# Patient Record
Sex: MALE | Race: WHITE | NOT HISPANIC OR LATINO | Employment: OTHER | URBAN - METROPOLITAN AREA
[De-identification: names, ages, dates, MRNs, and addresses within clinical notes are randomized per-mention and may not be internally consistent; named-entity substitution may affect disease eponyms.]

---

## 2017-02-06 ENCOUNTER — HISTORICAL (OUTPATIENT)
Dept: LAB | Facility: HOSPITAL | Age: 72
End: 2017-02-06

## 2022-04-04 ENCOUNTER — HISTORICAL (OUTPATIENT)
Dept: ADMINISTRATIVE | Facility: HOSPITAL | Age: 77
End: 2022-04-04

## 2022-04-10 ENCOUNTER — HISTORICAL (OUTPATIENT)
Dept: ADMINISTRATIVE | Facility: HOSPITAL | Age: 77
End: 2022-04-10

## 2022-04-27 VITALS
DIASTOLIC BLOOD PRESSURE: 60 MMHG | WEIGHT: 163.38 LBS | SYSTOLIC BLOOD PRESSURE: 100 MMHG | HEIGHT: 72 IN | BODY MASS INDEX: 22.13 KG/M2

## 2023-06-19 ENCOUNTER — HOSPITAL ENCOUNTER (INPATIENT)
Facility: HOSPITAL | Age: 78
LOS: 2 days | Discharge: HOME | DRG: 948 | End: 2023-06-21
Attending: INTERNAL MEDICINE | Admitting: INTERNAL MEDICINE
Payer: MEDICARE

## 2023-06-19 DIAGNOSIS — Z79.899 POLYPHARMACY: ICD-10-CM

## 2023-06-19 DIAGNOSIS — R07.9 CHEST PAIN: ICD-10-CM

## 2023-06-19 DIAGNOSIS — F41.9 ANXIETY: ICD-10-CM

## 2023-06-19 DIAGNOSIS — G62.9 NEUROPATHY: ICD-10-CM

## 2023-06-19 DIAGNOSIS — R53.1 WEAKNESS: ICD-10-CM

## 2023-06-19 DIAGNOSIS — R42 DIZZINESS: Primary | ICD-10-CM

## 2023-06-19 DIAGNOSIS — R53.81 DEBILITY: ICD-10-CM

## 2023-06-19 PROBLEM — G47.31 CENTRAL SLEEP APNEA SYNDROME: Status: ACTIVE | Noted: 2018-08-24

## 2023-06-19 PROBLEM — G20.A1 PARKINSON'S DISEASE: Status: ACTIVE | Noted: 2023-06-19

## 2023-06-19 PROBLEM — I10 HYPERTENSION: Status: ACTIVE | Noted: 2023-06-19

## 2023-06-19 PROBLEM — G47.00 PERSISTENT INSOMNIA: Status: ACTIVE | Noted: 2018-08-24

## 2023-06-19 LAB
ALBUMIN SERPL-MCNC: 3.8 G/DL (ref 3.4–4.8)
ALBUMIN/GLOB SERPL: 1.4 RATIO (ref 1.1–2)
ALP SERPL-CCNC: 101 UNIT/L (ref 40–150)
ALT SERPL-CCNC: 13 UNIT/L (ref 0–55)
AMPHET UR QL SCN: NEGATIVE
APPEARANCE UR: CLEAR
AST SERPL-CCNC: 18 UNIT/L (ref 5–34)
BACTERIA #/AREA URNS AUTO: NORMAL /HPF
BARBITURATE SCN PRESENT UR: NEGATIVE
BASOPHILS # BLD AUTO: 0.03 X10(3)/MCL
BASOPHILS NFR BLD AUTO: 0.3 %
BENZODIAZ UR QL SCN: NEGATIVE
BILIRUB UR QL STRIP.AUTO: NEGATIVE MG/DL
BILIRUBIN DIRECT+TOT PNL SERPL-MCNC: 0.3 MG/DL
BNP BLD-MCNC: 45.3 PG/ML
BUN SERPL-MCNC: 14.1 MG/DL (ref 8.4–25.7)
CALCIUM SERPL-MCNC: 8.6 MG/DL (ref 8.8–10)
CANNABINOIDS UR QL SCN: NEGATIVE
CHLORIDE SERPL-SCNC: 106 MMOL/L (ref 98–107)
CO2 SERPL-SCNC: 27 MMOL/L (ref 23–31)
COCAINE UR QL SCN: NEGATIVE
COLOR UR: YELLOW
CREAT SERPL-MCNC: 1.2 MG/DL (ref 0.73–1.18)
EOSINOPHIL # BLD AUTO: 0.08 X10(3)/MCL (ref 0–0.9)
EOSINOPHIL NFR BLD AUTO: 0.7 %
ERYTHROCYTE [DISTWIDTH] IN BLOOD BY AUTOMATED COUNT: 14.1 % (ref 11.5–17)
FENTANYL UR QL SCN: NEGATIVE
GFR SERPLBLD CREATININE-BSD FMLA CKD-EPI: >60 MLS/MIN/1.73/M2
GLOBULIN SER-MCNC: 2.8 GM/DL (ref 2.4–3.5)
GLUCOSE SERPL-MCNC: 134 MG/DL (ref 82–115)
GLUCOSE UR QL STRIP.AUTO: NEGATIVE MG/DL
HCT VFR BLD AUTO: 50.4 % (ref 42–52)
HGB BLD-MCNC: 16.2 G/DL (ref 14–18)
IMM GRANULOCYTES # BLD AUTO: 0.04 X10(3)/MCL (ref 0–0.04)
IMM GRANULOCYTES NFR BLD AUTO: 0.4 %
KETONES UR QL STRIP.AUTO: NEGATIVE MG/DL
LEUKOCYTE ESTERASE UR QL STRIP.AUTO: NEGATIVE UNIT/L
LYMPHOCYTES # BLD AUTO: 1.82 X10(3)/MCL (ref 0.6–4.6)
LYMPHOCYTES NFR BLD AUTO: 16.9 %
MAGNESIUM SERPL-MCNC: 2.1 MG/DL (ref 1.6–2.6)
MCH RBC QN AUTO: 29.1 PG (ref 27–31)
MCHC RBC AUTO-ENTMCNC: 32.1 G/DL (ref 33–36)
MCV RBC AUTO: 90.6 FL (ref 80–94)
MDMA UR QL SCN: NEGATIVE
MONOCYTES # BLD AUTO: 0.83 X10(3)/MCL (ref 0.1–1.3)
MONOCYTES NFR BLD AUTO: 7.7 %
NEUTROPHILS # BLD AUTO: 7.97 X10(3)/MCL (ref 2.1–9.2)
NEUTROPHILS NFR BLD AUTO: 74 %
NITRITE UR QL STRIP.AUTO: NEGATIVE
NRBC BLD AUTO-RTO: 0 %
OPIATES UR QL SCN: NEGATIVE
PCP UR QL: NEGATIVE
PH UR STRIP.AUTO: 5 [PH]
PH UR: 5 [PH] (ref 3–11)
PLATELET # BLD AUTO: 162 X10(3)/MCL (ref 130–400)
PMV BLD AUTO: 10.4 FL (ref 7.4–10.4)
POTASSIUM SERPL-SCNC: 3.8 MMOL/L (ref 3.5–5.1)
PROT SERPL-MCNC: 6.6 GM/DL (ref 5.8–7.6)
PROT UR QL STRIP.AUTO: ABNORMAL MG/DL
RBC # BLD AUTO: 5.56 X10(6)/MCL (ref 4.7–6.1)
RBC #/AREA URNS AUTO: NORMAL /HPF
RBC UR QL AUTO: NEGATIVE UNIT/L
SODIUM SERPL-SCNC: 142 MMOL/L (ref 136–145)
SP GR UR STRIP.AUTO: 1.02 (ref 1–1.03)
SPECIFIC GRAVITY, URINE AUTO (.000) (OHS): 1.02 (ref 1–1.03)
SQUAMOUS #/AREA URNS AUTO: NORMAL /HPF
TROPONIN I SERPL-MCNC: <0.01 NG/ML (ref 0–0.04)
TSH SERPL-ACNC: 1.64 UIU/ML (ref 0.35–4.94)
UROBILINOGEN UR STRIP-ACNC: 1 MG/DL
WBC # SPEC AUTO: 10.77 X10(3)/MCL (ref 4.5–11.5)
WBC #/AREA URNS AUTO: NORMAL /HPF

## 2023-06-19 PROCEDURE — 93010 EKG 12-LEAD: ICD-10-PCS | Mod: ,,, | Performed by: STUDENT IN AN ORGANIZED HEALTH CARE EDUCATION/TRAINING PROGRAM

## 2023-06-19 PROCEDURE — 25000003 PHARM REV CODE 250

## 2023-06-19 PROCEDURE — 80053 COMPREHEN METABOLIC PANEL: CPT

## 2023-06-19 PROCEDURE — 96374 THER/PROPH/DIAG INJ IV PUSH: CPT

## 2023-06-19 PROCEDURE — 99285 EMERGENCY DEPT VISIT HI MDM: CPT | Mod: 25

## 2023-06-19 PROCEDURE — 84484 ASSAY OF TROPONIN QUANT: CPT

## 2023-06-19 PROCEDURE — 93005 ELECTROCARDIOGRAM TRACING: CPT

## 2023-06-19 PROCEDURE — 85025 COMPLETE CBC W/AUTO DIFF WBC: CPT

## 2023-06-19 PROCEDURE — 83735 ASSAY OF MAGNESIUM: CPT

## 2023-06-19 PROCEDURE — 81001 URINALYSIS AUTO W/SCOPE: CPT

## 2023-06-19 PROCEDURE — 63600175 PHARM REV CODE 636 W HCPCS

## 2023-06-19 PROCEDURE — 84443 ASSAY THYROID STIM HORMONE: CPT

## 2023-06-19 PROCEDURE — 83880 ASSAY OF NATRIURETIC PEPTIDE: CPT

## 2023-06-19 PROCEDURE — 96361 HYDRATE IV INFUSION ADD-ON: CPT

## 2023-06-19 PROCEDURE — 11000001 HC ACUTE MED/SURG PRIVATE ROOM

## 2023-06-19 PROCEDURE — 93010 ELECTROCARDIOGRAM REPORT: CPT | Mod: ,,, | Performed by: STUDENT IN AN ORGANIZED HEALTH CARE EDUCATION/TRAINING PROGRAM

## 2023-06-19 PROCEDURE — 80307 DRUG TEST PRSMV CHEM ANLYZR: CPT

## 2023-06-19 RX ORDER — ACETAMINOPHEN 325 MG/1
650 TABLET ORAL EVERY 8 HOURS PRN
Status: DISCONTINUED | OUTPATIENT
Start: 2023-06-19 | End: 2023-06-21 | Stop reason: HOSPADM

## 2023-06-19 RX ORDER — ALPHA LIPOIC ACID 50 MG
250 TABLET ORAL NIGHTLY
COMMUNITY

## 2023-06-19 RX ORDER — IBUPROFEN 200 MG
24 TABLET ORAL
Status: DISCONTINUED | OUTPATIENT
Start: 2023-06-19 | End: 2023-06-21 | Stop reason: HOSPADM

## 2023-06-19 RX ORDER — GLUCAGON 1 MG
1 KIT INJECTION
Status: DISCONTINUED | OUTPATIENT
Start: 2023-06-19 | End: 2023-06-21 | Stop reason: HOSPADM

## 2023-06-19 RX ORDER — LINACLOTIDE 145 UG/1
145 CAPSULE, GELATIN COATED ORAL DAILY
COMMUNITY
Start: 2023-06-13

## 2023-06-19 RX ORDER — BUPRENORPHINE AND NALOXONE 8; 2 MG/1; MG/1
2 FILM, SOLUBLE BUCCAL; SUBLINGUAL DAILY
Status: DISCONTINUED | OUTPATIENT
Start: 2023-06-20 | End: 2023-06-19

## 2023-06-19 RX ORDER — MEMANTINE HYDROCHLORIDE 10 MG/1
10 TABLET ORAL 2 TIMES DAILY
COMMUNITY
Start: 2023-01-20 | End: 2024-01-20

## 2023-06-19 RX ORDER — BUPRENORPHINE AND NALOXONE 8; 2 MG/1; MG/1
2 FILM, SOLUBLE BUCCAL; SUBLINGUAL DAILY
COMMUNITY

## 2023-06-19 RX ORDER — BUPRENORPHINE HYDROCHLORIDE 8 MG/1
8 TABLET SUBLINGUAL DAILY
Status: DISCONTINUED | OUTPATIENT
Start: 2023-06-20 | End: 2023-06-20

## 2023-06-19 RX ORDER — PANTOPRAZOLE SODIUM 40 MG/1
40 TABLET, DELAYED RELEASE ORAL 2 TIMES DAILY
COMMUNITY
Start: 2023-02-23

## 2023-06-19 RX ORDER — DOCUSATE SODIUM 100 MG/1
100 CAPSULE, LIQUID FILLED ORAL 2 TIMES DAILY
COMMUNITY

## 2023-06-19 RX ORDER — ACETAMINOPHEN 325 MG/1
650 TABLET ORAL EVERY 4 HOURS PRN
Status: DISCONTINUED | OUTPATIENT
Start: 2023-06-19 | End: 2023-06-21 | Stop reason: HOSPADM

## 2023-06-19 RX ORDER — ONDANSETRON 2 MG/ML
4 INJECTION INTRAMUSCULAR; INTRAVENOUS EVERY 8 HOURS PRN
Status: DISCONTINUED | OUTPATIENT
Start: 2023-06-19 | End: 2023-06-21 | Stop reason: HOSPADM

## 2023-06-19 RX ORDER — DICYCLOMINE HYDROCHLORIDE 10 MG/1
10 CAPSULE ORAL EVERY 8 HOURS PRN
COMMUNITY
Start: 2023-03-28

## 2023-06-19 RX ORDER — ACETAMINOPHEN 500 MG
1000 TABLET ORAL EVERY 8 HOURS PRN
Status: DISCONTINUED | OUTPATIENT
Start: 2023-06-19 | End: 2023-06-21 | Stop reason: HOSPADM

## 2023-06-19 RX ORDER — CLONAZEPAM 1 MG/1
1 TABLET ORAL NIGHTLY PRN
COMMUNITY
Start: 2023-05-30

## 2023-06-19 RX ORDER — IBUPROFEN 200 MG
15 TABLET ORAL
Status: DISCONTINUED | OUTPATIENT
Start: 2023-06-19 | End: 2023-06-21 | Stop reason: HOSPADM

## 2023-06-19 RX ORDER — TESTOSTERONE CYPIONATE 200 MG/ML
200 INJECTION, SOLUTION INTRAMUSCULAR
COMMUNITY
Start: 2023-03-23

## 2023-06-19 RX ORDER — SODIUM CHLORIDE 0.9 % (FLUSH) 0.9 %
10 SYRINGE (ML) INJECTION EVERY 12 HOURS PRN
Status: DISCONTINUED | OUTPATIENT
Start: 2023-06-19 | End: 2023-06-21 | Stop reason: HOSPADM

## 2023-06-19 RX ORDER — ASPIRIN 81 MG/1
1 TABLET ORAL DAILY
COMMUNITY

## 2023-06-19 RX ORDER — ENOXAPARIN SODIUM 100 MG/ML
40 INJECTION SUBCUTANEOUS EVERY 24 HOURS
Status: DISCONTINUED | OUTPATIENT
Start: 2023-06-19 | End: 2023-06-21 | Stop reason: HOSPADM

## 2023-06-19 RX ORDER — ACARBOSE 25 MG/1
25 TABLET ORAL 3 TIMES DAILY
COMMUNITY

## 2023-06-19 RX ORDER — AMLODIPINE BESYLATE 5 MG/1
5 TABLET ORAL DAILY
COMMUNITY
Start: 2023-05-14

## 2023-06-19 RX ORDER — NALOXONE HCL 0.4 MG/ML
0.02 VIAL (ML) INJECTION
Status: DISCONTINUED | OUTPATIENT
Start: 2023-06-19 | End: 2023-06-21 | Stop reason: HOSPADM

## 2023-06-19 RX ORDER — NALOXEGOL OXALATE 25 MG/1
25 TABLET, FILM COATED ORAL DAILY
COMMUNITY
Start: 2023-05-17

## 2023-06-19 RX ORDER — DROPERIDOL 2.5 MG/ML
1.25 INJECTION, SOLUTION INTRAMUSCULAR; INTRAVENOUS
Status: COMPLETED | OUTPATIENT
Start: 2023-06-19 | End: 2023-06-19

## 2023-06-19 RX ADMIN — SODIUM CHLORIDE 1000 ML: 9 INJECTION, SOLUTION INTRAVENOUS at 12:06

## 2023-06-19 RX ADMIN — ENOXAPARIN SODIUM 40 MG: 40 INJECTION SUBCUTANEOUS at 08:06

## 2023-06-19 RX ADMIN — DROPERIDOL 1.25 MG: 2.5 INJECTION, SOLUTION INTRAMUSCULAR; INTRAVENOUS at 04:06

## 2023-06-19 NOTE — ED PROVIDER NOTES
"Encounter Date: 6/19/2023       History     Chief Complaint   Patient presents with    Dizziness     Pt c/o dizziness and blurry vision for 2 weeks after starting a new medication.      The patient is a 77 y.o. male with a history of hypertension, hyperlipidemia, chronic neuropathy who presents to the Emergency Department with a chief complaint of dizziness.  Patient reports that he was started on Lyrica by his neurologist on May 23rd.  He states that he was initially taking 25 mg once a day which was eventually increased to 3 times daily.  Patient states he then started to experience "debility".  He states that he has been having difficulty ambulating, generalized weakness, imbalance.  Wife states that they eventually cut Lyrica back to once daily before completely stopping the medication.  She states that the symptoms are still present even after stopping the medication.  Wife also states she thinks that patient needs to see a psychiatrist for mood disorder.  She states that she has been having a difficult time getting patient in with a psychiatrist because he is on Suboxone, Klonopin, as well as Lyrica.  Wife states that he is no longer even able to feed himself.  Symptoms began   Three weeks ago and have been worsening since onset. His pain is currently rated as a 5/10 in severity and described as aching with no radiation.  The patient denies chest pain, shortness of breath, fever, or chills. He reports taking nothing prior to arrival with no relief of symptoms. No other reported symptoms at this time.    The history is provided by the patient. No  was used.   Dizziness  This is a new problem. The current episode started more than 1 week ago. The problem occurs daily. The problem has not changed since onset.Pertinent negatives include no chest pain, no abdominal pain, no headaches and no shortness of breath. Nothing aggravates the symptoms. Nothing relieves the symptoms. He has tried nothing " for the symptoms. The treatment provided no relief.   Review of patient's allergies indicates:  No Known Allergies  Past Medical History:   Diagnosis Date    Hypertension      History reviewed. No pertinent surgical history.  History reviewed. No pertinent family history.  Social History     Tobacco Use    Smoking status: Never    Smokeless tobacco: Never     Review of Systems   Constitutional:  Negative for fever.   HENT:  Negative for congestion, rhinorrhea and sore throat.    Respiratory:  Negative for shortness of breath.    Cardiovascular:  Negative for chest pain.   Gastrointestinal:  Negative for abdominal pain and nausea.   Genitourinary:  Negative for dysuria.   Musculoskeletal:  Negative for back pain.   Skin:  Negative for rash.   Neurological:  Positive for dizziness, tremors and weakness. Negative for headaches.   Hematological:  Does not bruise/bleed easily.   All other systems reviewed and are negative.    Physical Exam     Initial Vitals [06/19/23 1129]   BP Pulse Resp Temp SpO2   (!) 164/90 88 18 97.5 °F (36.4 °C) 99 %      MAP       --         Physical Exam    Nursing note and vitals reviewed.  Constitutional: Vital signs are normal. He appears well-developed and well-nourished.   HENT:   Head: Normocephalic.   Right Ear: Hearing and tympanic membrane normal.   Left Ear: Hearing and tympanic membrane normal.   Mouth/Throat: Uvula is midline, oropharynx is clear and moist and mucous membranes are normal.   Eyes: Conjunctivae and EOM are normal. Pupils are equal, round, and reactive to light.   Cardiovascular:  Normal rate, regular rhythm, normal heart sounds and normal pulses.           Pulmonary/Chest: Effort normal and breath sounds normal.   Abdominal: Abdomen is soft. Bowel sounds are normal. There is no abdominal tenderness.   Musculoskeletal:      Cervical back: No spinous process tenderness or muscular tenderness.     Lymphadenopathy:     He has no cervical adenopathy.   Neurological: He is  alert. He displays tremor. GCS eye subscore is 4. GCS verbal subscore is 5. GCS motor subscore is 6.   Bilateral hand tremors   Skin: Skin is warm, dry and intact. Capillary refill takes less than 2 seconds.   Psychiatric: His affect is angry and blunt. He is agitated.       ED Course   Procedures  Labs Reviewed   COMPREHENSIVE METABOLIC PANEL - Abnormal; Notable for the following components:       Result Value    Glucose Level 134 (*)     Creatinine 1.20 (*)     Calcium Level Total 8.6 (*)     All other components within normal limits   URINALYSIS, REFLEX TO URINE CULTURE - Abnormal; Notable for the following components:    Protein, UA Trace (*)     All other components within normal limits   CBC WITH DIFFERENTIAL - Abnormal; Notable for the following components:    MCHC 32.1 (*)     All other components within normal limits   TROPONIN I - Normal   TSH - Normal   B-TYPE NATRIURETIC PEPTIDE - Normal   DRUG SCREEN, URINE (BEAKER) - Normal    Narrative:     Cut off concentrations:    Amphetamines - 1000 ng/ml  Barbiturates - 200 ng/ml  Benzodiazepine - 200 ng/ml  Cannabinoids (THC) - 50 ng/ml  Cocaine - 300 ng/ml  Fentanyl - 1.0 ng/ml  MDMA - 500 ng/ml  Opiates - 300 ng/ml   Phencyclidine (PCP) - 25 ng/ml    Specimen submitted for drug analysis and tested for pH and specific gravity in order to evaluate sample integrity. Suspect tampering if specific gravity is <1.003 and/or pH is not within the range of 4.5 - 8.0  False negatives may result form substances such as bleach added to urine.  False positives may result for the presence of a substance with similar chemical structure to the drug or its metabolite.    This test provides only a PRELIMINARY analytical test result. A more specific alternate chemical method must be used in order to obtain a confirmed analytical result. Gas chromatography/mass spectrometry (GC/MS) is the preferred confirmatory method. Other chemical confirmation methods are available. Clinical  consideration and professional judgement should be applied to any drug of abuse test result, particularly when preliminary positive results are used.    Positive results will be confirmed only at the physicians request. Unconfirmed screening results are to be used only for medical purposes (treatment).        MAGNESIUM - Normal   URINALYSIS, MICROSCOPIC - Normal   CBC W/ AUTO DIFFERENTIAL    Narrative:     The following orders were created for panel order CBC Auto Differential.  Procedure                               Abnormality         Status                     ---------                               -----------         ------                     CBC with Differential[[id number]]        Abnormal            Final result                 Please view results for these tests on the individual orders.          Imaging Results              CT Head Without Contrast (Final result)  Result time 06/19/23 14:29:19      Final result by Isai Mayfield MD (06/19/23 14:29:19)                   Impression:      No acute intracranial findings.      Electronically signed by: Isai Mayfield  Date:    06/19/2023  Time:    14:29               Narrative:    EXAMINATION:  CT HEAD WITHOUT CONTRAST    CLINICAL HISTORY:  Dizziness, persistent/recurrent, cardiac or vascular cause suspected;    TECHNIQUE:  CT imaging of the head performed from the skull base to the vertex without intravenous contrast. DLP 1011 mGycm. Automatic exposure control, adjustment of mA/kV or iterative reconstruction technique was used to reduce radiation.    COMPARISON:  27 July 2021    FINDINGS:  There is no acute cortical infarct, hemorrhage or mass lesion.  There is mild patchy hypoattenuation in the cerebral white matter which is nonspecific but most commonly associated with chronic small vessel ischemic changes.  The ventricles are not significantly enlarged.  There are vascular calcifications.    Visualized paranasal sinuses and mastoid air cells are clear.       "                                 X-Ray Chest 1 View (Final result)  Result time 06/19/23 12:30:36      Final result by Neo Werner MD (06/19/23 12:30:36)                   Impression:      No acute cardiopulmonary process.      Electronically signed by: Neo Werner  Date:    06/19/2023  Time:    12:30               Narrative:    EXAMINATION:  XR CHEST 1 VIEW    CLINICAL HISTORY:  weakness;    TECHNIQUE:  Single view of the chest    COMPARISON:  08/26/2014    FINDINGS:  No focal opacification, pleural effusion, or pneumothorax.    The cardiomediastinal silhouette is within normal limits.    No acute osseous abnormality.                                       Medications   droPERidol injection 1.25 mg (has no administration in time range)   sodium chloride 0.9% bolus 1,000 mL 1,000 mL (0 mLs Intravenous Stopped 6/19/23 1336)     Medical Decision Making:   Initial Assessment:   The patient is a 77 y.o. male with a history of hypertension, hyperlipidemia, chronic neuropathy who presents to the Emergency Department with a chief complaint of dizziness.  Patient reports that he was started on Lyrica by his neurologist on May 23rd.  He states that he was initially taking 25 mg once a day which was eventually increased to 3 times daily.  Patient states he then started to experience "debility".  He states that he has been having difficulty ambulating, generalized weakness, imbalance.  Wife states that they eventually cut Lyrica back to once daily before completely stopping the medication.  She states that the symptoms are still present even after stopping the medication.  Wife also states she thinks that patient needs to see a psychiatrist for mood disorder.  She states that she has been having a difficult time getting patient in with a psychiatrist because he is on Suboxone, Klonopin, as well as Lyrica.  Wife states that he is no longer even able to feed himself.  Symptoms began   Three weeks ago and have been " worsening since onset. His pain is currently rated as a 5/10 in severity and described as aching with no radiation.  The patient denies chest pain, shortness of breath, fever, or chills. He reports taking nothing prior to arrival with no relief of symptoms. No other reported symptoms at this time.  Differential Diagnosis:   Differential diagnosis include but are not limited to general debility, general deconditioning, dementia, urinary tract infection, CVA, polypharmacy, psychiatric mood disorder  Clinical Tests:   Lab Tests: Ordered and Reviewed  Radiological Study: Reviewed and Ordered  Medical Tests: Ordered and Reviewed  ED Management:  MDM  History obtained by patient.  Co-morbidities and/or factors adding to the complexity or risk for the patient?: none  Differential diagnoses: Differential diagnosis include but are not limited to general debility, general deconditioning, dementia, urinary tract infection, CVA, polypharmacy, psychiatric mood disorder  Decision to obtain previous or outside records?: no  Chart Review (nursing home, outside records, CareEverywhere): none  Review of RX medications/new RX prescribed by me?: none  My EKG Interpretation: see above  Labs/imaging/other tests obtained/considered (risk/benefits of testing discussed):  CBC, CMP, TSH, troponin, BNP, UA, UDS  Labs/tests intepretation: Creatinine 1.20; Otherwise labs unremarkable: CT head with no acute findings  My independent imaging interpretation: none  Treatment/interventions, IV fluids, IV medications: inapsine  Complex management (IV controlled substances, went to OR, DNR, meds requiring monitoring, transfer, etc)?: none  Workup/treatment affected by social determinants of health?: none   Point of care US done/interpretation: none  Consults/radiologist/EMS/social work/family discussion/alternate history: none  Advanced care planning/end of life discussion: none  Shared decision making: Shared decision making with patient and  wife.  ETOH/smoking/drug cessation discussion: none  Dispo: Admit. Will admit to hospital medicine for neurology and psychiatric consult. Patient would also likely benefit from PT/OT. Wife states patient is severely debilitated.    Other:   I have discussed this case with another health care provider.       <> Summary of the Discussion: Discussed with ED attending, , he had face to face encounter with patient.     Discussed with KAYY Rider with hospital medicine. Accepts admission for Dr. Hoskins.            ED Course as of 06/19/23 1630   Mon Jun 19, 2023   1554 Discussed with  who had face to face encounter with patient. Recommends 1.25mg of inapsine. Will discuss with hospital medicine for admission.  [LM]      ED Course User Index  [LM] Juliette Sevilla NP                 Clinical Impression:   Final diagnoses:  [R53.1] Weakness  [R42] Dizziness (Primary)  [R53.81] Debility  [F41.9] Anxiety  [G62.9] Neuropathy  [Z79.899] Polypharmacy        ED Disposition Condition    Admit Stable                Juliette Sevilla NP  06/19/23 1631

## 2023-06-19 NOTE — H&P
[facility] Hospital Medicine History & Physical Examination       Patient Name: Dax Boone  MRN: [id number]  Patient Class: IP- Inpatient   Admission Date: 6/19/2023   Admitting Physician: MIRIAN Service   Length of Stay: 0  Attending Physician:    Primary Care Provider: Darian Crawford  Face-to-Face encounter date: 06/19/2023  Code Status:Full   Chief Complaint: Dizziness (Pt c/o dizziness and blurry vision for 2 weeks after starting a new medication. )    Patient information was obtained from patient, patient's family, past medical records and ER records.  ED records were reviewed in detail and documented below    HISTORY OF PRESENT ILLNESS:   Dax Boone is a 77 y.o. male who has a past medical history of HTN, HLD, CAD/PCI, CVA, functional movement disorder, dementia, and chronic neuropathy followed by Dr. Estrada who presented to [facility] on 6/19/2023 with a primary complaint of dizziness and bilateral lower extremity weakness since May 23rd when he started Lyrica.  He is accompanied by his wife who assists with history and explains that he took Lyrica 25 mg daily for 1 week that was increased to b.i.d. the 2nd week, and before it could be increased to t.i.d. she completely stopped his Lyrica on June 10th.  She states that his equilibrium/balance worsened until he was he unable to ambulate which started on June 10th. She states prior to starting Lyrica he was ambulating without any difficulties.  The patient denies any falls, syncope, trauma, or MVAs.  The patient and wife were requesting psychiatric consultation for anxiety and dementia.  He is on Klonopin, Suboxone, and previously on Lyrica.  Patient states that his symptoms have been constant, nothing makes better or worse, no radiating pains, and rates generalized weakness and intermittent dizziness 6/10 in severity.  He does admit to some anxiety.  He denies any chest pain, shortness of breath, cough,  palpitations, abdominal pain, nausea, vomiting, diarrhea, dysuria, bleeding symptoms, headaches, visual disturbances, depression, suicidal ideations, homicidal ideations, hallucinations, syncope, unilateral weakness, falls, any injuries.      ED course: Vital signs revealed the patient is afebrile stable on room air.  Labs with unremarkable CBC, CMP with mildly elevated creatinine of 1.20 normal BUN 14, mildly elevated glucose of 134, unremarkable TSH, BNP, troponin, UDS, and UA.  CT head without contrast showed no acute intracranial abnormalities.  CXR showed no acute cardiopulmonary findings.  Patient treated in the ED with 1 L normal saline and droperidol.      Patient admitted for neuropathy , generalized weakness, and polypharmacy        PAST MEDICAL HISTORY:     Chronic neuropathy  Polypharmacy   Functional movement disorder   Dementia   Opiate dependence disorder   Generalized anxiety disorder   Mild cognitive impairment  History of CVA  Cervical degenerative disc disease   Coronary artery disease with history of PCI (2018)   Essential hypertension  Hypercholesterolemia     PAST SURGICAL HISTORY:   Lumbar spinal fusion in 1970s   PCI in 2018    ALLERGIES:   Patient has no known allergies.    FAMILY HISTORY:   Reviewed and negative per patient and wife    SOCIAL HISTORY:     Social History     Tobacco Use    Smoking status: Never    Smokeless tobacco: Never   Substance Use Topics    Alcohol use: Not on file    Alcohol: Denies use   Drugs: Denies use    HOME MEDICATIONS:     Prior to Admission medications    Not on File   MD to nurse to reconcile home medications    REVIEW OF SYSTEMS:   Except as documented, all other systems reviewed and negative     PHYSICAL EXAM:     VITAL SIGNS: 24 HRS MIN & MAX LAST   Temp  Min: 97.5 °F (36.4 °C)  Max: 97.5 °F (36.4 °C) 97.5 °F (36.4 °C)   BP  Min: 149/91  Max: 164/90 (!) 162/97   Pulse  Min: 73  Max: 97  97   Resp  Min: 18  Max: 22 (!) 22   SpO2  Min: 96 %  Max: 99 % 96 %        General appearance: Well-developed, well-nourished elderly  male in no apparent distress with wife at bedside  HENT: Atraumatic head. Moist mucous membranes of oral cavity.  Eyes: Normal extraocular movements.   Neck: Supple.   Lungs: Clear to auscultation bilaterally. No wheezing present.   Heart: Regular rate and rhythm. S1 and S2 present with no murmurs/gallop/rub. No pedal edema. No JVD present.   Abdomen: Soft, non-distended, non-tender. No rebound tenderness/guarding. Bowel sounds are normal.   Extremities: No cyanosis, clubbing, or edema.  Skin: No Rash.   Neuro: Motor and sensory exams grossly intact. Good tone. Muscle strength 4/5 in all 4 extremities.  Patient has shuffling gait on exam.  Does not appear ataxic.  Psych/mental status: Appropriate mood and affect. Responds appropriately to questions.     LABS AND IMAGING:     Recent Labs   Lab 06/19/23  1209   WBC 10.77   RBC 5.56   HGB 16.2   HCT 50.4   MCV 90.6   MCH 29.1   MCHC 32.1*   RDW 14.1      MPV 10.4       Recent Labs   Lab 06/19/23  1209      K 3.8   CO2 27   BUN 14.1   CREATININE 1.20*   CALCIUM 8.6*   MG 2.10   ALBUMIN 3.8   ALKPHOS 101   ALT 13   AST 18   BILITOT 0.3       Microbiology Results (last 7 days)       ** No results found for the last 168 hours. **             CT Head Without Contrast  Narrative: EXAMINATION:  CT HEAD WITHOUT CONTRAST    CLINICAL HISTORY:  Dizziness, persistent/recurrent, cardiac or vascular cause suspected;    TECHNIQUE:  CT imaging of the head performed from the skull base to the vertex without intravenous contrast. DLP 1011 mGycm. Automatic exposure control, adjustment of mA/kV or iterative reconstruction technique was used to reduce radiation.    COMPARISON:  27 July 2021    FINDINGS:  There is no acute cortical infarct, hemorrhage or mass lesion.  There is mild patchy hypoattenuation in the cerebral white matter which is nonspecific but most commonly associated with chronic small  vessel ischemic changes.  The ventricles are not significantly enlarged.  There are vascular calcifications.    Visualized paranasal sinuses and mastoid air cells are clear.  Impression: No acute intracranial findings.    Electronically signed by: Isai Mayfield  Date:    06/19/2023  Time:    14:29  X-Ray Chest 1 View  Narrative: EXAMINATION:  XR CHEST 1 VIEW    CLINICAL HISTORY:  weakness;    TECHNIQUE:  Single view of the chest    COMPARISON:  08/26/2014    FINDINGS:  No focal opacification, pleural effusion, or pneumothorax.    The cardiomediastinal silhouette is within normal limits.    No acute osseous abnormality.  Impression: No acute cardiopulmonary process.    Electronically signed by: Neo Werner  Date:    06/19/2023  Time:    12:30        ASSESSMENT & PLAN:     Neuropathy   Generalized weakness  Functional movement disorder   Polypharmacy    Dementia   Opiate dependence disorder   Generalized anxiety disorder   Mild cognitive impairment  History of CVA  Cervical degenerative disc disease   Coronary artery disease with history of PCI (2018)   Essential hypertension  Hypercholesterolemia     -Consider neurological consultation   -Consider MRI of lumbar spine   -Consult to psychiatrist and appreciate their recommendations  -MD to nurse to reconcile home medications   -Hold Lyrica and resume appropriate home medicines once reconciled  -Consulted PT and OT   -A.m. labs-CBC, CMP, magnesium, and phosphorus      VTE Prophylaxis: will be placed on Lovenox for DVT prophylaxis and will be advised to be as mobile as possible and sit in a chair as tolerated    Patient condition:  Stable  __________________________________________________________________________  INPATIENT LIST OF MEDICATIONS     Scheduled Meds:   enoxparin  40 mg Subcutaneous Daily     Continuous Infusions:  PRN Meds:.acetaminophen, acetaminophen, acetaminophen, dextrose 10%, dextrose 10%, dextrose, dextrose, glucagon (human recombinant), naloxone,  ondansetron, sodium chloride 0.9%, trazodone      I, Jaylen SUN have reviewed and discussed the case with    Please see the following addendum for further assessment and plan from there attending MD.    06/19/2023    Gato-chart reviewed patient examined.  Patient is 77-year-old male with history of hypertension/hyperlipidemia/CAD with PCI, CVA, functional movement disorder, dementia and chronic neuropathy followed by Dr. Estrada as well as Dr. Hilton Smith.  Patient presents complaining of dizziness/blurry vision after he stops his Lyrica around 2 weeks ago.  Patient refers his neurologist place him on Lyrica low-dose 25 mg p.o. daily and slowly increase to 50 with development of ataxia/blurry vision/dizziness.  Patient probably has some depression as well as underlying dementia.  Explained to patient's wife that Mr. Lazo has a primary care physician that actually comes to [facility] today care of his patients.  Will allow the patient to resume Suboxone and will consult psych as requested per patient's wife.  We will notify Dr. Smith that his patient is at the emergency room, going to be admitted tonight so he can come see him tomorrow to make disposition.    ________________________________________________________________________________

## 2023-06-19 NOTE — FIRST PROVIDER EVALUATION
"Medical screening examination initiated.  I have conducted a focused provider triage encounter, findings are as follows:    Brief history of present illness:  77-year-old male presents to ER with complaint of dizziness for the last 2 weeks.  Patient states about 3 weeks ago he was started on Lyrica.  He states that he is currently taking Lyrica 3 times a day.  Patient states that he is having difficulty taking care of himself because of how the medication makes him feel.  Patient states that he feels he is on too many narcotic medications.    Vitals:    06/19/23 1129   BP: (!) 164/90   Pulse: 88   Resp: 18   Temp: 97.5 °F (36.4 °C)   TempSrc: Temporal   SpO2: 99%   Weight: 77.1 kg (170 lb)   Height: 5' 11" (1.803 m)       Pertinent physical exam:  Awake and alert, no acute distress    Brief workup plan:  Labs, imaging, UA, UDS    Preliminary workup initiated; this workup will be continued and followed by the physician or advanced practice provider that is assigned to the patient when roomed.  "

## 2023-06-19 NOTE — Clinical Note
Diagnosis: Dizziness [128001]   Admitting Provider:: CHENCHO MATHIS [[id number]]   Future Attending Provider: CHENCHO MATHIS [[id number]]   Reason for IP Medical Treatment  (Clinical interventions that can only be accomplished in the IP setting? ) :: Neurology, psychiatric consult; PT/PT   I certify that Inpatient services for greater than or equal to 2 midnights are medically necessary:: Yes   Plans for Post-Acute care--if anticipated (pick the single best option):: A. No post acute care anticipated at this time

## 2023-06-20 PROBLEM — R29.898 WEAKNESS OF BOTH LOWER EXTREMITIES: Status: ACTIVE | Noted: 2023-06-20

## 2023-06-20 LAB
ALBUMIN SERPL-MCNC: 3.5 G/DL (ref 3.4–4.8)
ALBUMIN/GLOB SERPL: 1.6 RATIO (ref 1.1–2)
ALP SERPL-CCNC: 91 UNIT/L (ref 40–150)
ALT SERPL-CCNC: 10 UNIT/L (ref 0–55)
AST SERPL-CCNC: 16 UNIT/L (ref 5–34)
BASOPHILS # BLD AUTO: 0.03 X10(3)/MCL
BASOPHILS NFR BLD AUTO: 0.3 %
BILIRUBIN DIRECT+TOT PNL SERPL-MCNC: 0.7 MG/DL
BUN SERPL-MCNC: 11.2 MG/DL (ref 8.4–25.7)
CALCIUM SERPL-MCNC: 8.5 MG/DL (ref 8.8–10)
CHLORIDE SERPL-SCNC: 108 MMOL/L (ref 98–107)
CO2 SERPL-SCNC: 27 MMOL/L (ref 23–31)
CREAT SERPL-MCNC: 1.14 MG/DL (ref 0.73–1.18)
EOSINOPHIL # BLD AUTO: 0.08 X10(3)/MCL (ref 0–0.9)
EOSINOPHIL NFR BLD AUTO: 0.8 %
ERYTHROCYTE [DISTWIDTH] IN BLOOD BY AUTOMATED COUNT: 13.9 % (ref 11.5–17)
GFR SERPLBLD CREATININE-BSD FMLA CKD-EPI: >60 MLS/MIN/1.73/M2
GLOBULIN SER-MCNC: 2.2 GM/DL (ref 2.4–3.5)
GLUCOSE SERPL-MCNC: 99 MG/DL (ref 82–115)
HCT VFR BLD AUTO: 46.6 % (ref 42–52)
HGB BLD-MCNC: 15.1 G/DL (ref 14–18)
IMM GRANULOCYTES # BLD AUTO: 0.03 X10(3)/MCL (ref 0–0.04)
IMM GRANULOCYTES NFR BLD AUTO: 0.3 %
LYMPHOCYTES # BLD AUTO: 2.67 X10(3)/MCL (ref 0.6–4.6)
LYMPHOCYTES NFR BLD AUTO: 26.8 %
MAGNESIUM SERPL-MCNC: 2 MG/DL (ref 1.6–2.6)
MCH RBC QN AUTO: 29 PG (ref 27–31)
MCHC RBC AUTO-ENTMCNC: 32.4 G/DL (ref 33–36)
MCV RBC AUTO: 89.6 FL (ref 80–94)
MONOCYTES # BLD AUTO: 0.77 X10(3)/MCL (ref 0.1–1.3)
MONOCYTES NFR BLD AUTO: 7.7 %
NEUTROPHILS # BLD AUTO: 6.4 X10(3)/MCL (ref 2.1–9.2)
NEUTROPHILS NFR BLD AUTO: 64.1 %
NRBC BLD AUTO-RTO: 0 %
PHOSPHATE SERPL-MCNC: 3 MG/DL (ref 2.3–4.7)
PLATELET # BLD AUTO: 160 X10(3)/MCL (ref 130–400)
PMV BLD AUTO: 10.7 FL (ref 7.4–10.4)
POTASSIUM SERPL-SCNC: 4.3 MMOL/L (ref 3.5–5.1)
PROT SERPL-MCNC: 5.7 GM/DL (ref 5.8–7.6)
RBC # BLD AUTO: 5.2 X10(6)/MCL (ref 4.7–6.1)
SODIUM SERPL-SCNC: 141 MMOL/L (ref 136–145)
WBC # SPEC AUTO: 9.98 X10(3)/MCL (ref 4.5–11.5)

## 2023-06-20 PROCEDURE — 25000003 PHARM REV CODE 250: Performed by: INTERNAL MEDICINE

## 2023-06-20 PROCEDURE — 80053 COMPREHEN METABOLIC PANEL: CPT

## 2023-06-20 PROCEDURE — 25000003 PHARM REV CODE 250: Performed by: NURSE PRACTITIONER

## 2023-06-20 PROCEDURE — 97165 OT EVAL LOW COMPLEX 30 MIN: CPT

## 2023-06-20 PROCEDURE — 84100 ASSAY OF PHOSPHORUS: CPT

## 2023-06-20 PROCEDURE — 63600175 PHARM REV CODE 636 W HCPCS

## 2023-06-20 PROCEDURE — 97162 PT EVAL MOD COMPLEX 30 MIN: CPT

## 2023-06-20 PROCEDURE — 25000003 PHARM REV CODE 250

## 2023-06-20 PROCEDURE — 21400001 HC TELEMETRY ROOM

## 2023-06-20 PROCEDURE — 83735 ASSAY OF MAGNESIUM: CPT

## 2023-06-20 PROCEDURE — 85025 COMPLETE CBC W/AUTO DIFF WBC: CPT

## 2023-06-20 RX ORDER — TRAZODONE HYDROCHLORIDE 50 MG/1
50 TABLET ORAL NIGHTLY
Status: DISCONTINUED | OUTPATIENT
Start: 2023-06-20 | End: 2023-06-21 | Stop reason: HOSPADM

## 2023-06-20 RX ORDER — DIVALPROEX SODIUM 250 MG/1
250 TABLET, FILM COATED, EXTENDED RELEASE ORAL EVERY 12 HOURS
Status: DISCONTINUED | OUTPATIENT
Start: 2023-06-20 | End: 2023-06-21 | Stop reason: HOSPADM

## 2023-06-20 RX ORDER — CLONAZEPAM 1 MG/1
1 TABLET ORAL DAILY
Status: DISCONTINUED | OUTPATIENT
Start: 2023-06-20 | End: 2023-06-21 | Stop reason: HOSPADM

## 2023-06-20 RX ORDER — BUPRENORPHINE 2 MG/1
4 TABLET SUBLINGUAL
Status: DISCONTINUED | OUTPATIENT
Start: 2023-06-20 | End: 2023-06-21 | Stop reason: HOSPADM

## 2023-06-20 RX ADMIN — CLONAZEPAM 1 MG: 1 TABLET ORAL at 10:06

## 2023-06-20 RX ADMIN — BUPRENORPHINE 4 MG: 2 TABLET SUBLINGUAL at 05:06

## 2023-06-20 RX ADMIN — ENOXAPARIN SODIUM 40 MG: 40 INJECTION SUBCUTANEOUS at 04:06

## 2023-06-20 RX ADMIN — DIVALPROEX SODIUM 250 MG: 250 TABLET, EXTENDED RELEASE ORAL at 10:06

## 2023-06-20 RX ADMIN — TRAZODONE HYDROCHLORIDE 50 MG: 50 TABLET ORAL at 10:06

## 2023-06-20 RX ADMIN — BUPRENORPHINE 8 MG: 8 TABLET SUBLINGUAL at 10:06

## 2023-06-20 RX ADMIN — TRAZODONE HYDROCHLORIDE 25 MG: 50 TABLET ORAL at 12:06

## 2023-06-20 NOTE — PROGRESS NOTES
"6/20/2023  Dax Boone   1945   [id number]            Psychiatry Inpatient Admission Note    Date of Admission: 6/19/2023 11:38 AM      Chief Complaint: "My problems begin in 2008 after I was bit by a tick. I had neurological problems".    SUBJECTIVE:   History of Present Illness:   aDx Boone is a 77 y.o. male presents to ER with complaint of dizziness for the last 2 weeks.  Patient states about 3 weeks ago he was started on Lyrica.  He states that he is currently taking Lyrica 3 times a day.  Patient states that he is having difficulty taking care of himself because of how the medication makes him feel. Patient states that he feels he is on too many narcotic medications. Psych consulted for management of depression and anxiety.     +stress: complicated medical condition and chronic mental illness. He indicates his PCP and his Neurologist were trying to regulate his pain by adjusting his meds. He reports for 8 years he felt he had a quality of life when he was treated by a physician in [place] with Suboxone and Clonazepam. He reports despite the warning to prevent the combination of Suboxone and Clonazepam, his physician in [place] disregarded the regulation and prescribed both meds to improve his mood and pain. He understandings the consequences of combing both meds but he verbalized frustration with the regs. He reports with the Clonazepam he is able to function. He is able to leave the home and his benign tremors are decreased. He reports he is able to relax and watch tv. He reports the goal was to wean Suboxone to a lower dose as Lyrica was being introduced. He reports whe the Lyrica was implemented he felt weak. He reports he requires hospitalization for med adjustment to avoid major side effects. He reports the only pain medication that allows him to perform daily task is Suboxone. He is anxious about his quality of life declining without both meds. He has been making attempts to find a " provider that will prescribe both meds. He reports he is trying to find a provider with common sense. His wife reports he felt discouraged and rejected when a local clinic informed him that they will prescribe Suboxone without the Clonazepam. His wife reports there are times he will beg a provider to prescribe both meds in combiation until his request is met. He has an upcoming appointment in July with MANDEEP Rudd and they are hoping she will prescribe both.     He has history of MDD and TASH. His wife reviewed his past medical history. His wife indicates he has periods of rage. He justifies his behaviors by stating his irritability is based on people meeting expectations. His wife indicates he has been hospitalized in the past for increased agitation. He becomes agitated to the point he is yelling, screaming, and pounding on objects. His wife indicates he will have outbursts in the public. His wife indicates he has been diagnosed with paranoia because he will obsess over an idea until he meets his needs. He has decreased sleep. Appetite is good. Energy is improving. He denies AVH.     Past Psychiatric History:   Previous Psychiatric Hospitalizations: [facility] - 10 years ago; [facility] in [place], [facility] in [place]   Previous Medication Trials: Valium, Xanax, Tranxene, Effexor, Luvox, Prozac, Cymbalta, Trazodone, Depakote, Lexapro-causes nightmares, Lunesta, Trileptal, Risperdal, Seroquel-zombie, Ambien-hallucinate at night, Zyprexa, Inderal  Previous Suicide Attempts: no attempts   Outpatient psychiatrist: [facility] - 10 years ago    Past Medical/Surgical History:   Past Medical History:   Diagnosis Date    Hypertension      History reviewed. No pertinent surgical history.      Family Psychiatric History:   PTSD-father    Allergies:   Review of patient's allergies indicates:  No Known Allergies    Substance Abuse History:   Tobacco: he denies  Alcohol: he denies  Illicit Substances: he denies  Treatment:  he denies      Current Medications:   Home Psychiatric Meds: clonazepam, suboxone, trazodone    Scheduled Meds:    buprenorphine HCL  8 mg Sublingual Daily    enoxparin  40 mg Subcutaneous Daily      PRN Meds: acetaminophen, acetaminophen, acetaminophen, dextrose 10%, dextrose 10%, glucagon (human recombinant), glucose, glucose, naloxone, ondansetron, sodium chloride 0.9%, trazodone   Psychotherapeutics (From admission, onward)      Start     Stop Route Frequency Ordered    06/19/23 6618  trazodone split tablet 25 mg         -- Oral Nightly PRN 06/19/23 1659              Social History:  Housing Status: resides with his wife  Relationship Status/Sexual Orientation: heterosexual   Children: 1  Education: 12th grade, completed [email or web address]   Employment Status/Info: retired, forestry    [employer] history: no history  History of physical/sexual abuse: he denies   Access to gun: he denies       Legal History:   Past Charges/Incarcerations: he denies   Pending charges: he denies      Vitals   Vitals:    06/20/23 1311   BP: (!) 161/82   Pulse: 76   Resp:    Temp: 99.1 °F (37.3 °C)        Labs/Imaging/Studies:   Recent Results (from the past 48 hour(s))   Comprehensive metabolic panel    Collection Time: 06/19/23 12:09 PM   Result Value Ref Range    Sodium Level 142 136 - 145 mmol/L    Potassium Level 3.8 3.5 - 5.1 mmol/L    Chloride 106 98 - 107 mmol/L    Carbon Dioxide 27 23 - 31 mmol/L    Glucose Level 134 (H) 82 - 115 mg/dL    Blood Urea Nitrogen 14.1 8.4 - 25.7 mg/dL    Creatinine 1.20 (H) 0.73 - 1.18 mg/dL    Calcium Level Total 8.6 (L) 8.8 - 10.0 mg/dL    Protein Total 6.6 5.8 - 7.6 gm/dL    Albumin Level 3.8 3.4 - 4.8 g/dL    Globulin 2.8 2.4 - 3.5 gm/dL    Albumin/Globulin Ratio 1.4 1.1 - 2.0 ratio    Bilirubin Total 0.3 <=1.5 mg/dL    Alkaline Phosphatase 101 40 - 150 unit/L    Alanine Aminotransferase 13 0 - 55 unit/L    Aspartate Aminotransferase 18 5 - 34 unit/L    eGFR >60 mls/min/1.73/m2   Troponin I    Collection  Time: 06/19/23 12:09 PM   Result Value Ref Range    Troponin-I <0.010 0.000 - 0.045 ng/mL   TSH    Collection Time: 06/19/23 12:09 PM   Result Value Ref Range    Thyroid Stimulating Hormone 1.643 0.350 - 4.940 uIU/mL   Brain natriuretic peptide    Collection Time: 06/19/23 12:09 PM   Result Value Ref Range    Natriuretic Peptide 45.3 <=100.0 pg/mL   CBC with Differential    Collection Time: 06/19/23 12:09 PM   Result Value Ref Range    WBC 10.77 4.50 - 11.50 x10(3)/mcL    RBC 5.56 4.70 - 6.10 x10(6)/mcL    Hgb 16.2 14.0 - 18.0 g/dL    Hct 50.4 42.0 - 52.0 %    MCV 90.6 80.0 - 94.0 fL    MCH 29.1 27.0 - 31.0 pg    MCHC 32.1 (L) 33.0 - 36.0 g/dL    RDW 14.1 11.5 - 17.0 %    Platelet 162 130 - 400 x10(3)/mcL    MPV 10.4 7.4 - 10.4 fL    Neut % 74.0 %    Lymph % 16.9 %    Mono % 7.7 %    Eos % 0.7 %    Basophil % 0.3 %    Lymph # 1.82 0.6 - 4.6 x10(3)/mcL    Neut # 7.97 2.1 - 9.2 x10(3)/mcL    Mono # 0.83 0.1 - 1.3 x10(3)/mcL    Eos # 0.08 0 - 0.9 x10(3)/mcL    Baso # 0.03 <=0.2 x10(3)/mcL    IG# 0.04 0 - 0.04 x10(3)/mcL    IG% 0.4 %    NRBC% 0.0 %   Magnesium    Collection Time: 06/19/23 12:09 PM   Result Value Ref Range    Magnesium Level 2.10 1.60 - 2.60 mg/dL   Urinalysis, Reflex to Urine Culture    Collection Time: 06/19/23 12:38 PM    Specimen: Urine   Result Value Ref Range    Color, UA Yellow Yellow, Light-Yellow, Dark Yellow, Leonor, Straw    Appearance, UA Clear Clear    Specific Gravity, UA 1.025 1.005 - 1.030    pH, UA 5.0 5.0 - 8.5    Protein, UA Trace (A) Negative mg/dL    Glucose, UA Negative Negative, Normal mg/dL    Ketones, UA Negative Negative mg/dL    Blood, UA Negative Negative unit/L    Bilirubin, UA Negative Negative mg/dL    Urobilinogen, UA 1.0 0.2, 1.0, Normal mg/dL    Nitrites, UA Negative Negative    Leukocyte Esterase, UA Negative Negative unit/L   Drug Screen, Urine    Collection Time: 06/19/23 12:38 PM   Result Value Ref Range    Amphetamines, Urine Negative Negative    Barbituates, Urine  Negative Negative    Benzodiazepine, Urine Negative Negative    Cannabinoids, Urine Negative Negative    Cocaine, Urine Negative Negative    Fentanyl, Urine Negative Negative    MDMA, Urine Negative Negative    Opiates, Urine Negative Negative    Phencyclidine, Urine Negative Negative    pH, Urine 5.0 3.0 - 11.0    Specific Gravity, Urine Auto 1.025 1.001 - 1.035   Urinalysis, Microscopic    Collection Time: 06/19/23 12:38 PM   Result Value Ref Range    RBC, UA 0-5 None Seen, 0-2, 3-5, 0-5 /HPF    WBC, UA 0-2 None Seen, 0-2, 3-5, 0-5 /HPF    Squamous Epithelial Cells, UA 0-4 0-4, None Seen /HPF    Bacteria, UA None Seen None Seen, Rare, Occasional /HPF   Comprehensive Metabolic Panel    Collection Time: 06/20/23  3:37 AM   Result Value Ref Range    Sodium Level 141 136 - 145 mmol/L    Potassium Level 4.3 3.5 - 5.1 mmol/L    Chloride 108 (H) 98 - 107 mmol/L    Carbon Dioxide 27 23 - 31 mmol/L    Glucose Level 99 82 - 115 mg/dL    Blood Urea Nitrogen 11.2 8.4 - 25.7 mg/dL    Creatinine 1.14 0.73 - 1.18 mg/dL    Calcium Level Total 8.5 (L) 8.8 - 10.0 mg/dL    Protein Total 5.7 (L) 5.8 - 7.6 gm/dL    Albumin Level 3.5 3.4 - 4.8 g/dL    Globulin 2.2 (L) 2.4 - 3.5 gm/dL    Albumin/Globulin Ratio 1.6 1.1 - 2.0 ratio    Bilirubin Total 0.7 <=1.5 mg/dL    Alkaline Phosphatase 91 40 - 150 unit/L    Alanine Aminotransferase 10 0 - 55 unit/L    Aspartate Aminotransferase 16 5 - 34 unit/L    eGFR >60 mls/min/1.73/m2   Magnesium    Collection Time: 06/20/23  3:37 AM   Result Value Ref Range    Magnesium Level 2.00 1.60 - 2.60 mg/dL   Phosphorus    Collection Time: 06/20/23  3:37 AM   Result Value Ref Range    Phosphorus Level 3.0 2.3 - 4.7 mg/dL   CBC with Differential    Collection Time: 06/20/23  3:37 AM   Result Value Ref Range    WBC 9.98 4.50 - 11.50 x10(3)/mcL    RBC 5.20 4.70 - 6.10 x10(6)/mcL    Hgb 15.1 14.0 - 18.0 g/dL    Hct 46.6 42.0 - 52.0 %    MCV 89.6 80.0 - 94.0 fL    MCH 29.0 27.0 - 31.0 pg    MCHC 32.4 (L) 33.0  - 36.0 g/dL    RDW 13.9 11.5 - 17.0 %    Platelet 160 130 - 400 x10(3)/mcL    MPV 10.7 (H) 7.4 - 10.4 fL    Neut % 64.1 %    Lymph % 26.8 %    Mono % 7.7 %    Eos % 0.8 %    Basophil % 0.3 %    Lymph # 2.67 0.6 - 4.6 x10(3)/mcL    Neut # 6.40 2.1 - 9.2 x10(3)/mcL    Mono # 0.77 0.1 - 1.3 x10(3)/mcL    Eos # 0.08 0 - 0.9 x10(3)/mcL    Baso # 0.03 <=0.2 x10(3)/mcL    IG# 0.03 0 - 0.04 x10(3)/mcL    IG% 0.3 %    NRBC% 0.0 %      Lab Results   Component Value Date    VALPROATE <12.5 (L) 11/11/2020       Psychiatric Mental Status Exam:  General Appearance: appears stated age, well-nourished, dressed in hospital garb, lying in bed  Arousal: alert  Behavior: polite  Movements and Motor Activity:  history of tremos  Orientation: oriented to person, place, time, and situation  Speech: normal rate, rhythm, volume, tone and pitch  Mood: Anxious  Affect: tearful, irritable  Thought Process:  justifying, rationalizing, circumstantial  Associations: no loosening of associations  Thought Content and Perceptions: no suicidal or homicidal ideation, no auditory or visual hallucinations, no paranoid ideation, no ideas of reference, no evidence of delusions or psychosis  Recent and Remote Memory: recent memory intact, remote memory intact; per interview/observation with patient  Attention and Concentration: attentive to conversation, able to spell WORLD forwards and backwards; per interview/observation with patient  Fund of Knowledge: aware of current events, correctly identifies the ; based on history, vocabulary, fund of knowledge, syntax, grammar, and content  Insight: poor; based on understanding of severity of illness and HPI  Judgment: poor; based on patient's behavior and HPI      Patient Strengths:  Access to care, Able to verbalize needs, and Family/Peer support      Patient Liabilities:  Medication non-compliance, Complicated medical illness, and Anxiety      ASSESSMENT/PLAN:    Diagnoses:  SUBSTANCE-RELATED DISORDERS; Opioid-Related Disorders; 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  and Sedative-, Hypnotic-, or Anxiolytic-Related Disorders; Sedative, Hypnotic, 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  I have evaluated the mental status of the above named person and concur with the findings of all assessments.  I have provided medical direction for the development of the Treatment Plan.        Carrie Velez

## 2023-06-20 NOTE — PT/OT/SLP EVAL
"Physical Therapy Evaluation and Discharge Note    Patient Name:  Dax Boone   MRN:  [id number]    Recommendations:     Discharge Recommendations: home  Discharge Equipment Recommendations: none   Barriers to discharge:  medical dx    Assessment:     Dax Boone is a 77 y.o. male admitted with a medical diagnosis of dizziness, blurry vision, weakness since about 5/23/23 and exacerbated 6/10/23 to the point where pt's mobility has been significantly limited; appears to be associated with a new medication.  Patient with PMH of CVA, "functional mobility disorder", dementia, anxiety, chronic neuropathy.   At this time, patient is functioning at their prior level of function and does not require further acute PT services. Discussed this with pt and spouse who are in agreement. All other questions/concerns were addressed and answered as appropriate.     Recent Surgery: * No surgery found *      Plan:     During this hospitalization, patient does not require further acute PT services.  Please re-consult if situation changes.      Subjective     Chief Complaint: n/a  Patient/Family Comments/goals: to go home   Pain/Comfort:  Pain Rating 1: 0/10    Patients cultural, spiritual, Spiritism conflicts given the current situation: no    Living Environment:  Pt lives with spouse in a H; 5 steps to enter/exit with B railings   Prior to admission, patients level of function was independent-- of note, at the beginning of 2023 pt was hospitalized and required a rehab stent and was dc back home. From there he was transferring and ambulating independently. He experienced a set back recently and felt as if his mobility was very limited and his spouse reported feeling concerns due to his unsteadiness.  Equipment used/owned at home: walker, rolling, shower chair.   Upon discharge, patient will have assistance from spouse .    Objective:     Communicated with NSG prior to session.  Patient found supine with blood pressure " cuff, pulse ox (continuous) upon PT entry to room.    General Precautions: Standard,      Orthopedic Precautions:N/A   Braces: N/A  Respiratory Status: Room air  Blood Pressure: 157/90    Exams:  Cognitive Exam:  Patient is oriented to Person, Place, Time, and Situation  RLE Strength: WFL  LLE Strength: WFL  Sensation: pt c/o lips and tongue burning, facial numbness, and B LE numb and tingling (reports this is baseline)     Functional Mobility:  Bed Mobility:     Supine to Sit: independence  Sit to Supine: independence  Transfers:     Sit to Stand:  supervision with no AD  Gait: pt ambulated approx 230 ft without use of an AD; decreased blanca; step through pattern; pt did experience 2 minor LOB when turning but was able to self correct, no other overt LOB or safety concerns noted  Both pt and spouse commented on how well he was moving as compared to the recent past  PT did recommend pt to use RW upon returning home for safety until he regains a little more strength     Patient and spouse provided with verbal education regarding POC, mobility, safety, dc planning.  Understanding was verbalized.     Patient left supine with all lines intact, call button in reach, and MD notified. Spouse present.     GOALS:   Multidisciplinary Problems       Physical Therapy Goals       Not on file                    History:     Past Medical History:   Diagnosis Date    Hypertension        History reviewed. No pertinent surgical history.    Time Tracking:     PT Received On: 06/20/23  PT Start Time: 0925     PT Stop Time: 0958  PT Total Time (min): 33 min     Billable Minutes: Evaluation mod      06/20/2023

## 2023-06-20 NOTE — PROGRESS NOTES
[facility]  Emergency Dept  [facility] Medicine  Progress Note    Patient Name: Dax Boone  MRN: [id number]  Patient Class: IP- Inpatient   Admission Date: 6/19/2023  Length of Stay: 1 days  Attending Physician: Ronald Hoskins MD  Primary Care Provider: Darian Crawford        Subjective:     Principal Problem:<principal problem not specified>    Interval History: Patient stable improving slowly. Will consult neuro for further evaluation. Continue management. Hospitalist was advised of the change.    Review of Systems   Psychiatric/Behavioral:  Positive for confusion. The patient is nervous/anxious.    All other systems reviewed and are negative.  Objective:     Vital Signs (Most Recent):  Temp: 97.5 °F (36.4 °C) (06/19/23 1129)  Pulse: 64 (06/20/23 0402)  Resp: 13 (06/19/23 1802)  BP: 123/79 (06/20/23 0402)  SpO2: (!) 94 % (06/20/23 0402) Vital Signs (24h Range):  Temp:  [97.5 °F (36.4 °C)] 97.5 °F (36.4 °C)  Pulse:  [64-99] 64  Resp:  [13-22] 13  SpO2:  [93 %-99 %] 94 %  BP: (123-164)/() 123/79     Weight: 77.1 kg (170 lb)  Body mass index is 23.71 kg/m².  No intake or output data in the 24 hours ending 06/20/23 1015   Physical Exam  Vitals and nursing note reviewed. Exam conducted with a chaperone present.   HENT:      Head: Normocephalic and atraumatic.      Right Ear: Tympanic membrane, ear canal and external ear normal.      Left Ear: Tympanic membrane, ear canal and external ear normal.      Nose: Nose normal.      Mouth/Throat:      Mouth: Mucous membranes are moist.   Eyes:      Extraocular Movements: Extraocular movements intact.      Pupils: Pupils are equal, round, and reactive to light.   Cardiovascular:      Rate and Rhythm: Normal rate and regular rhythm.      Pulses: Normal pulses.      Heart sounds: Normal heart sounds.   Pulmonary:      Effort: Pulmonary effort is normal.      Breath sounds: Normal breath sounds.   Abdominal:      General: Abdomen is flat.       Palpations: Abdomen is soft.   Musculoskeletal:         General: Normal range of motion.      Cervical back: Normal range of motion and neck supple.   Skin:     General: Skin is warm and dry.   Neurological:      General: No focal deficit present.      Mental Status: He is alert and oriented to person, place, and time.      Motor: Weakness present.   Psychiatric:         Mood and Affect: Mood normal.         Overview/Hospital Course: As above    Significant Labs: All pertinent labs within the past 24 hours have been reviewed.  CBC:   Recent Labs   Lab 06/19/23  1209 06/20/23  0337   WBC 10.77 9.98   HGB 16.2 15.1   HCT 50.4 46.6    160     CMP:   Recent Labs   Lab 06/19/23  1209 06/20/23  0337    141   K 3.8 4.3   CO2 27 27   BUN 14.1 11.2   CREATININE 1.20* 1.14   CALCIUM 8.6* 8.5*   ALBUMIN 3.8 3.5   BILITOT 0.3 0.7   ALKPHOS 101 91   AST 18 16   ALT 13 10       Significant Imaging: I have reviewed all pertinent imaging results/findings within the past 24 hours.  CT: I have reviewed all pertinent results/findings within the past 24 hours and my personal findings are:  reviewed    Assessment/Plan:  1-AMS possible secondary to Lyrica  2-Chronic pain on Detox from Opioids with Suboxone  3-Neuropathy lower back and generalized   4-Anxiety/depression  Will consult Neuro who started Lyrica an see if they want to change medications      There are no hospital problems to display for this patient.    VTE Risk Mitigation (From admission, onward)           Ordered     enoxaparin injection 40 mg  Daily         06/19/23 1659     IP VTE HIGH RISK PATIENT  Once         06/19/23 1659     Place sequential compression device  Until discontinued         06/19/23 1659                       Yefri Smith MD  Department of Hospital Medicine   [facility] - Emergency Dept

## 2023-06-20 NOTE — SUBJECTIVE & OBJECTIVE
Past Medical History:   Diagnosis Date    Hypertension        History reviewed. No pertinent surgical history.    Review of patient's allergies indicates:  No Known Allergies    Current Neurological Medications:     No current facility-administered medications on file prior to encounter.     Current Outpatient Medications on File Prior to Encounter   Medication Sig    acarbose (PRECOSE) 25 MG Tab Take 25 mg by mouth 3 (three) times daily.    alpha lipoic acid 50 mg Tab Take 250 mg by mouth every evening.    amLODIPine (NORVASC) 5 MG tablet Take 5 mg by mouth once daily.    aspirin (ECOTRIN) 81 MG EC tablet Take 1 tablet by mouth once daily.    buprenorphine-naloxone 8-2 mg (SUBOXONE) 8-2 mg Place 2 Film under the tongue once daily. 1/2 film 4 times daily    clonazePAM (KLONOPIN) 1 MG tablet Take 1 mg by mouth nightly as needed.    dicyclomine (BENTYL) 10 MG capsule Take 10 mg by mouth every 8 (eight) hours as needed.    docusate sodium (COLACE) 100 MG capsule Take 100 mg by mouth 2 (two) times daily.    LINZESS 145 mcg Cap capsule Take 145 mcg by mouth once daily.    memantine (NAMENDA) 10 MG Tab Take 10 mg by mouth 2 (two) times a day.    naloxegoL (MOVANTIK) 25 mg tablet Take 25 mg by mouth once daily.    pantoprazole (PROTONIX) 40 MG tablet Take 40 mg by mouth 2 (two) times a day. AM/evening    testosterone cypionate (DEPOTESTOTERONE CYPIONATE) 200 mg/mL injection Inject 200 mg into the muscle every 14 (fourteen) days.     Family History    None       Tobacco Use    Smoking status: Never    Smokeless tobacco: Never   Substance and Sexual Activity    Alcohol use: Not on file    Drug use: Not on file    Sexual activity: Not on file     Review of Systems  A 14pt ros was reviewed & is negative unless o/w documented in the hpi    Objective:     Vital Signs (Most Recent):  Temp: 97.5 °F (36.4 °C) (06/19/23 1129)  Pulse: 71 (06/20/23 1130)  Resp: 20 (06/20/23 1130)  BP: 137/81 (06/20/23 1130)  SpO2: 96 % (06/20/23 1130)  Vital Signs (24h Range):  Pulse:  [64-99] 71  Resp:  [13-22] 20  SpO2:  [93 %-96 %] 96 %  BP: (123-168)/() 137/81     Weight: 77.1 kg (170 lb)  Body mass index is 23.71 kg/m².     Physical Exam  Vitals reviewed.   Constitutional:       General: He is awake.      Appearance: Normal appearance.   HENT:      Head: Normocephalic and atraumatic.      Nose: Nose normal.      Mouth/Throat:      Mouth: Mucous membranes are moist.      Pharynx: Oropharynx is clear.   Eyes:      Extraocular Movements: Extraocular movements intact and EOM normal.      Pupils: Pupils are equal, round, and reactive to light.   Pulmonary:      Effort: Pulmonary effort is normal.   Skin:     General: Skin is warm and dry.   Neurological:      Mental Status: He is alert and oriented to person, place, and time.      Gait: Gait is intact.   Psychiatric:         Mood and Affect: Mood normal.         Speech: Speech normal.         Behavior: Behavior normal. Behavior is cooperative.        NEUROLOGICAL EXAMINATION:     MENTAL STATUS   Oriented to person, place, and time.   Follows 3 step commands.   Attention: normal. Concentration: normal.   Speech: speech is normal   Level of consciousness: alert  Knowledge: good.   Normal comprehension.     CRANIAL NERVES     CN II   Visual fields full to confrontation.     CN III, IV, VI   Pupils are equal, round, and reactive to light.  Extraocular motions are normal.   Nystagmus: none   Conjugate gaze: present    CN V   Facial sensation intact.     CN VII   Facial expression full, symmetric.     MOTOR EXAM   Right arm pronator drift: absent  Left arm pronator drift: absent    Strength   Right deltoid: 5/5  Left deltoid: 5/5  Right biceps: 5/5  Left biceps: 5/5  Right triceps: 5/5  Left triceps: 5/5  Right quadriceps: 5/5  Left quadriceps: 5/5  Right hamstrin/5  Left hamstrin/5  Right glutei: 5/5  Left glutei: 5/5  Right anterior tibial: 5/5  Left anterior tibial: 5/5  Right posterior tibial:  5/5  Left posterior tibial: 5/5    SENSORY EXAM   Light touch normal.     GAIT AND COORDINATION     Gait  Gait: normal (slow,steady)    Tremor   Resting tremor: absent  Action tremor: left arm and right arm    Significant Labs:   Recent Lab Results         06/20/23  0337   06/19/23  1238        Phencyclidine   Negative       Albumin/Globulin Ratio 1.6         Albumin 3.5         Alkaline Phosphatase 91         ALT 10         Amphetamine Screen, Ur   Negative       Appearance, UA   Clear       AST 16         Bacteria, UA   None Seen       Barbiturate Screen, Ur   Negative       Baso # 0.03         Basophil % 0.3         Benzodiazepine Screen, Urine   Negative       BILIRUBIN TOTAL 0.7         Bilirubin, UA   Negative       BUN 11.2         Calcium 8.5         Cannabinoids, Urine   Negative       Chloride 108         CO2 27         Cocaine (Metab.)   Negative       Color, UA   Yellow       Creatinine 1.14         eGFR >60         Eos # 0.08         Eosinophil % 0.8         Fentanyl, Urine   Negative       Globulin, Total 2.2         Glucose 99         Glucose, UA   Negative       Hematocrit 46.6         Hemoglobin 15.1         Immature Grans (Abs) 0.03         Immature Granulocytes 0.3         Ketones, UA   Negative       Leukocytes, UA   Negative       Lymph # 2.67         LYMPH % 26.8         Magnesium 2.00         MCH 29.0         MCHC 32.4         MCV 89.6         MDMA, Urine   Negative       Mono # 0.77         Mono % 7.7         MPV 10.7         Neut # 6.40         Neut % 64.1         NITRITE UA   Negative       nRBC 0.0         Occult Blood UA   Negative       Opiate Scrn, Ur   Negative       pH, UA   5.0       pH, Urine   5.0       Phosphorus 3.0         Platelets 160         Potassium 4.3         PROTEIN TOTAL 5.7         Protein, UA   Trace       RBC 5.20         RBC, UA   0-5       RDW 13.9         Sodium 141         Specific Gravity,UA   1.025       Specific Gravity, Urine Auto   1.025       Squam Epithel,  UA   0-4       Urobilinogen, UA   1.0       WBC, UA   0-2       WBC 9.98                 Significant Imaging:   CT head w/o 6/19/2023:  FINDINGS:  There is no acute cortical infarct, hemorrhage or mass lesion.  There is mild patchy hypoattenuation in the cerebral white matter which is nonspecific but most commonly associated with chronic small vessel ischemic changes.  The ventricles are not significantly enlarged.  There are vascular calcifications.     Visualized paranasal sinuses and mastoid air cells are clear.     Impression:     No acute intracranial findings.    I have reviewed all pertinent imaging results/findings within the past 24 hours.

## 2023-06-20 NOTE — HPI
77-year-old male with PMH HTN, HLD, CAD SP PCI, CVA, functional movement disorder, dementia, and chronic neuropathy who presented to ED on 06/19 with complaints of dizziness in BLE weakness.  Weakness reportedly began on 05/23 when he started Lyrica.  Patient's neurologist Dr. Estrada started patient on Lyrica 25 mg daily x1 week on 05/23, that was increased to b.i.d. on the 2nd week, and was planned to increase to t.i.d. on the 3rd week which was 6/10.  Patient's BLE weakness began the day he started Lyrica and progress to the point where he was not ambulatory on 06/10, for which he completely stopped his Lyrica on that day.  Patient denies falls, syncope, or any trauma.  Patient reports he was started on Lyrica for pain with plans to eventually be weaned off of Suboxone.  Patient's weakness has significantly improved, as he has 5/5 strength to BLE and is able to ambulate without assistance on exam.    Of note, patient has significant anxiety and depression for which family is requesting psych consultation for.    CT head without unrevealing for acute intracranial abnormalities.  Labs unremarkable.  Significant home medications include clonazepam 1 mg q.h.s., memantine 10 mg b.i.d., Suboxone, and naloxegol 25 mg daily.

## 2023-06-20 NOTE — NURSING
Nurses Note -- 4 Eyes      6/20/2023   5:11 PM      Skin assessed during: Admit      [x] No Altered Skin Integrity Present    []Prevention Measures Documented      [] Yes- Altered Skin Integrity Present or Discovered   [] LDA Added if Not in Epic (Describe Wound)   [] New Altered Skin Integrity was Present on Admit and Documented in LDA   [] Wound Image Taken    Wound Care Consulted? No    Attending Nurse:  TODD Melara RN/Staff Member:  Esme Fleming RN

## 2023-06-20 NOTE — PT/OT/SLP EVAL
Occupational Therapy   Evaluation and Discharge Note    Name: Dax Boone  MRN: [id number]  Admitting Diagnosis: <principal problem not specified>  Recent Surgery: * No surgery found *      Recommendations:     Discharge Recommendations: home  Discharge Equipment Recommendations:    Barriers to discharge:       Assessment:     Dax Boone is a 77 y.o. male with a medical diagnosis of dizziness, bilateral lower extremity weakness since starting lyrica, hx of functional mvmt dx, dementia, chronic neuropathy, polypharmacy. At this time, patient is functioning at their prior level of function and does not require further acute OT services. Discussed weighted utensils/arm weights to address patient's issues with feeding and working on the computer, using tools caused by essential tremors.     Plan:     During this hospitalization, patient does not require further acute OT services.  Please re-consult if situation changes.    Plan of Care Reviewed with: patient, spouse    Subjective         Occupational Profile:  Living Environment: pt lives with wife, 6 steps to enter with rail, shower with chair   Previous level of function: independent   Roles and Routines: -  Equipment Used at home: shower chair, walker, rolling  Assistance upon Discharge: wife     Pain/Comfort:  Pain Rating 1: 3/10  Location - Side 1: Bilateral  Location 1: foot  Pain Addressed 1: Reposition    Patients cultural, spiritual, Sabianism conflicts given the current situation:      Objective:     Communicated with: nrsg prior to session.  Patient found HOB elevated with   upon OT entry to room.    General Precautions: Standard,    Orthopedic Precautions:    Braces:    Respiratory Status: Room air   Vital Signs:      Occupational Performance:    Bed Mobility:    Patient completed Supine to Sit with independence  Patient completed Sit to Supine with independence    Functional Mobility/Transfers:  Patient completed Toilet Transfer Step Transfer  technique with independence with  no AD  Functional Mobility: no LOB with ambulation to toilet     Activities of Daily Living:  Feeding:  pt reports frequent spilling of items when completing hand to mouth- discussed weighted utensil/cups to address this issue     Lower Body Dressing: independence    Toileting: independence      Cognitive/Visual Perceptual:  Cognitive/Psychosocial Skills:     -       Oriented to: Person, Place, Time, and Situation     Physical Exam:  Sensation:    -       Impaired  pt reports neuropathy in BUE   Upper Extremity Strength:    -       Right Upper Extremity: WFL  -       Left Upper Extremity: WFL  Fine Motor Coordination:    -       Impaired  Left hand, finger to nose   and Right hand, finger to nose -slightly impaired 2/2 essential tremors       AMPAC 6 Click ADL:  AMPAC Total Score:      Therapeutic Positioning  Risk for acquired pressure injuries is decreased due to ability to mobilize independently .      Patient Education:  Patient and spouse provided with verbal education regarding OT role/goals/POC and Discharge/DME recommendations.  Understanding was verbalized.     Patient left HOB elevated with all lines intact and call button in reach    GOALS:   Multidisciplinary Problems       Occupational Therapy Goals       Not on file                    History:     Past Medical History:   Diagnosis Date    Hypertension        History reviewed. No pertinent surgical history.    Time Tracking:     OT Date of Treatment:    OT Start Time: 1328  OT Stop Time: 1350  OT Total Time (min): 22 min    Billable Minutes:Evaluation Low Complexity     6/20/2023

## 2023-06-20 NOTE — PROGRESS NOTES
Attempted to meet with patient to conduct initial cm dc planning assessment but neurology in the room.

## 2023-06-21 VITALS
DIASTOLIC BLOOD PRESSURE: 77 MMHG | SYSTOLIC BLOOD PRESSURE: 147 MMHG | BODY MASS INDEX: 23.8 KG/M2 | RESPIRATION RATE: 20 BRPM | TEMPERATURE: 98 F | OXYGEN SATURATION: 92 % | HEIGHT: 71 IN | WEIGHT: 170 LBS | HEART RATE: 62 BPM

## 2023-06-21 PROBLEM — R29.898 WEAKNESS OF BOTH LOWER EXTREMITIES: Status: RESOLVED | Noted: 2023-06-20 | Resolved: 2023-06-21

## 2023-06-21 PROCEDURE — 63600175 PHARM REV CODE 636 W HCPCS

## 2023-06-21 PROCEDURE — 25000003 PHARM REV CODE 250: Performed by: INTERNAL MEDICINE

## 2023-06-21 PROCEDURE — 25000003 PHARM REV CODE 250: Performed by: NURSE PRACTITIONER

## 2023-06-21 RX ORDER — TRAZODONE HYDROCHLORIDE 50 MG/1
50 TABLET ORAL NIGHTLY
Qty: 30 TABLET | Refills: 11 | Status: SHIPPED | OUTPATIENT
Start: 2023-06-21 | End: 2024-06-20

## 2023-06-21 RX ORDER — DIVALPROEX SODIUM 250 MG/1
250 TABLET, FILM COATED, EXTENDED RELEASE ORAL EVERY 12 HOURS
Qty: 60 TABLET | Refills: 11 | Status: SHIPPED | OUTPATIENT
Start: 2023-06-21 | End: 2024-06-20

## 2023-06-21 RX ADMIN — BUPRENORPHINE 4 MG: 2 TABLET SUBLINGUAL at 02:06

## 2023-06-21 RX ADMIN — DIVALPROEX SODIUM 250 MG: 250 TABLET, EXTENDED RELEASE ORAL at 09:06

## 2023-06-21 RX ADMIN — BUPRENORPHINE 4 MG: 2 TABLET SUBLINGUAL at 09:06

## 2023-06-21 RX ADMIN — ENOXAPARIN SODIUM 40 MG: 40 INJECTION SUBCUTANEOUS at 04:06

## 2023-06-21 NOTE — DISCHARGE SUMMARY
[facility] - 9th Floor Med Surg  [facility] Medicine  Discharge Summary      Patient Name: Dax Boone  MRN: [id number]  Admission Date: 6/19/2023  Hospital Length of Stay: 2 days  Discharge Date and Time:  06/21/2023 6:11 PM  Attending Physician: Yefri Eli,*   Discharging Provider: Yefri Eli MD  Discharge Provider Team: Networked reference to record PCT   Primary Care Provider: Darian Crawford        HPI: Patient admitted with severe weakness, wife stated he started to get dizzy when dose of Lyrica was increased. We discontinue Lyrica and follow closely. Consulted Neuro and Psych for evaluation. Patient improved slowly by 6 21 he was coherent and Psych and Neuro recommendations were noted. Psych meds changes were discussed with patient and wife and will follow OP, patient was released for discharge on 6/21. He was discharge in stable conditions with appointment for OP follow up with Neuro, Psych and my office.    * No surgery found *      Hospital Course: As above    Consults:   Consults (From admission, onward)          Status Ordering Provider     Inpatient consult to Neurology  Once        Provider:  Rafael Bullock MD    Completed YEFRI ELI     Inpatient consult to Psychiatry  Once        Provider:  [facility]    Acknowledged PA ARMSTRONG            Final Active Diagnoses:      Problems Resolved During this Admission:    Diagnosis Date Noted Date Resolved POA    PRINCIPAL PROBLEM:  Weakness of both lower extremities [R29.898] 06/20/2023 06/21/2023 Yes      Discharged Condition: good    Disposition: Home or Self Care    Follow Up:    Patient Instructions:      Diet Adult Regular     Activity as tolerated     Medications:  Reconciled Home Medications:      Medication List        START taking these medications      divalproex  MG 24 hr tablet  Commonly known as: DEPAKOTE ER  Take 1 tablet (250 mg total) by mouth every 12  (twelve) hours.     traZODone 50 MG tablet  Commonly known as: DESYREL  Take 1 tablet (50 mg total) by mouth every evening.            CONTINUE taking these medications      acarbose 25 MG Tab  Commonly known as: PRECOSE  Take 25 mg by mouth 3 (three) times daily.     alpha lipoic acid 50 mg Tab  Take 250 mg by mouth every evening.     amLODIPine 5 MG tablet  Commonly known as: NORVASC  Take 5 mg by mouth once daily.     aspirin 81 MG EC tablet  Commonly known as: ECOTRIN  Take 1 tablet by mouth once daily.     buprenorphine-naloxone 8-2 mg 8-2 mg  Commonly known as: SUBOXONE  Place 2 Film under the tongue once daily. 1/2 film 4 times daily     clonazePAM 1 MG tablet  Commonly known as: KlonoPIN  Take 1 mg by mouth nightly as needed.     dicyclomine 10 MG capsule  Commonly known as: BENTYL  Take 10 mg by mouth every 8 (eight) hours as needed.     docusate sodium 100 MG capsule  Commonly known as: COLACE  Take 100 mg by mouth 2 (two) times daily.     LINZESS 145 mcg Cap capsule  Generic drug: linaCLOtide  Take 145 mcg by mouth once daily.     memantine 10 MG Tab  Commonly known as: NAMENDA  Take 10 mg by mouth 2 (two) times a day.     MOVANTIK 25 mg tablet  Generic drug: naloxegoL  Take 25 mg by mouth once daily.     pantoprazole 40 MG tablet  Commonly known as: PROTONIX  Take 40 mg by mouth 2 (two) times a day. AM/evening     testosterone cypionate 200 mg/mL injection  Commonly known as: DEPOTESTOTERONE CYPIONATE  Inject 200 mg into the muscle every 14 (fourteen) days.              Significant Diagnostic Studies: Labs: [facility]   Recent Labs   Lab 06/20/23  0337      K 4.3   CO2 27   BUN 11.2   CREATININE 1.14   CALCIUM 8.5*   ALBUMIN 3.5   BILITOT 0.7   ALKPHOS 91   AST 16   ALT 10       Pending Diagnostic Studies:       None          Indwelling Lines/Drains at time of discharge:   Lines/Drains/Airways       None                   Time spent on the discharge of patient: 45 minutes         Yefri Smith  MD  Department of Hospital Medicine  [facility]

## 2023-06-21 NOTE — PLAN OF CARE
06/21/23 0955   Discharge Assessment   Assessment Type Discharge Planning Assessment   Confirmed/corrected address, phone number and insurance Yes   Confirmed Demographics Correct on Facesheet   Source of Information family;patient   People in Home spouse   Do you expect to return to your current living situation? Yes   Do you have help at home or someone to help you manage your care at home? Yes   Prior to hospitilization cognitive status: Alert/Oriented   Current cognitive status: Alert/Oriented   Home Accessibility stairs to enter home   Number of Stairs, Main Entrance six   Stair Railings, Main Entrance railings safe and in good condition   Home Layout Able to live on 1st floor   Equipment Currently Used at Home walker, standard;shower chair   Readmission within 30 days? No   Patient currently being followed by outpatient case management? No   Do you currently have service(s) that help you manage your care at home? No   Do you take prescription medications? Yes   Do you have prescription coverage? Yes   Do you have any problems affording any of your prescribed medications? No   Is the patient taking medications as prescribed? yes   How do you get to doctors appointments? car, drives self;family or friend will provide   Are you on dialysis? No   Do you take coumadin? No   Discharge Plan A Home with family   Discharge Plan B Home   DME Needed Upon Discharge  none   Discharge Plan discussed with: Patient;Spouse/sig other   Transition of Care Barriers None     Patient lives with spouse in a 2 story home with 6 steps with railing upon entrance. Patient does not use second story of home. DME in home: Shower chair, walker. Patient reports being independent 3 weeks ago with ADLS including driving but reports taking an experimental medication that has caused deconditioning and weakness. PCP: Dr. Crawford, Pharmacy: [facility] in [place]

## 2023-06-21 NOTE — PROGRESS NOTES
"6/21/2023  Dax Boone   1945   [id number]        Psychiatry Progress Note     Chief Complaint: "Yesterday was a good day after I got the Clonazepam at 6. My health was a decline within the last year. I have just been trying to find someone to help me with my meds".    SUBJECTIVE:   Dax Boone is a 77 y.o. male presents to ER with complaint of dizziness for the last 2 weeks.  Patient states about 3 weeks ago he was started on Lyrica.  He states that he is currently taking Lyrica 3 times a day.  Patient states that he is having difficulty taking care of himself because of how the medication makes him feel. Patient states that he feels he is on too many narcotic medications. Psych consulted for management of depression and anxiety.     At time of visit, he was observed lying in bed with his wife at his bedside. He is less anxious. He is in a pleasant mood after Clonazepam was included in his medication regimen. He reports he feels more hopeful. He is hoping he will find a provider for outpatient setting to continue regimen. He reports he slept better last evening. He indicates the environment was conducive for resting. Appetite and energy is good. He denies SI/HI. Thought process is linear.        Current Medications:   Scheduled Meds:    buprenorphine HCL  4 mg Sublingual Q6H WAKE    clonazePAM  1 mg Oral Daily    divalproex ER  250 mg Oral Q12H    enoxparin  40 mg Subcutaneous Daily    trazodone  50 mg Oral QHS      PRN Meds: acetaminophen, acetaminophen, acetaminophen, dextrose 10%, dextrose 10%, glucagon (human recombinant), glucose, glucose, naloxone, ondansetron, sodium chloride 0.9%, trazodone   Psychotherapeutics (From admission, onward)      Start     Stop Route Frequency Ordered    06/20/23 2100  traZODone tablet 50 mg         -- Oral Nightly 06/20/23 1600    06/19/23 1758  trazodone split tablet 25 mg         -- Oral Nightly PRN 06/19/23 1659            Allergies:   Review of patient's " allergies indicates:  No Known Allergies     OBJECTIVE:   Vitals   Vitals:    06/21/23 0813   BP: 119/72   Pulse: 74   Resp:    Temp: 97.5 °F (36.4 °C)        Labs/Imaging/Studies:   Recent Results (from the past 36 hour(s))   Comprehensive Metabolic Panel    Collection Time: 06/20/23  3:37 AM   Result Value Ref Range    Sodium Level 141 136 - 145 mmol/L    Potassium Level 4.3 3.5 - 5.1 mmol/L    Chloride 108 (H) 98 - 107 mmol/L    Carbon Dioxide 27 23 - 31 mmol/L    Glucose Level 99 82 - 115 mg/dL    Blood Urea Nitrogen 11.2 8.4 - 25.7 mg/dL    Creatinine 1.14 0.73 - 1.18 mg/dL    Calcium Level Total 8.5 (L) 8.8 - 10.0 mg/dL    Protein Total 5.7 (L) 5.8 - 7.6 gm/dL    Albumin Level 3.5 3.4 - 4.8 g/dL    Globulin 2.2 (L) 2.4 - 3.5 gm/dL    Albumin/Globulin Ratio 1.6 1.1 - 2.0 ratio    Bilirubin Total 0.7 <=1.5 mg/dL    Alkaline Phosphatase 91 40 - 150 unit/L    Alanine Aminotransferase 10 0 - 55 unit/L    Aspartate Aminotransferase 16 5 - 34 unit/L    eGFR >60 mls/min/1.73/m2   Magnesium    Collection Time: 06/20/23  3:37 AM   Result Value Ref Range    Magnesium Level 2.00 1.60 - 2.60 mg/dL   Phosphorus    Collection Time: 06/20/23  3:37 AM   Result Value Ref Range    Phosphorus Level 3.0 2.3 - 4.7 mg/dL   CBC with Differential    Collection Time: 06/20/23  3:37 AM   Result Value Ref Range    WBC 9.98 4.50 - 11.50 x10(3)/mcL    RBC 5.20 4.70 - 6.10 x10(6)/mcL    Hgb 15.1 14.0 - 18.0 g/dL    Hct 46.6 42.0 - 52.0 %    MCV 89.6 80.0 - 94.0 fL    MCH 29.0 27.0 - 31.0 pg    MCHC 32.4 (L) 33.0 - 36.0 g/dL    RDW 13.9 11.5 - 17.0 %    Platelet 160 130 - 400 x10(3)/mcL    MPV 10.7 (H) 7.4 - 10.4 fL    Neut % 64.1 %    Lymph % 26.8 %    Mono % 7.7 %    Eos % 0.8 %    Basophil % 0.3 %    Lymph # 2.67 0.6 - 4.6 x10(3)/mcL    Neut # 6.40 2.1 - 9.2 x10(3)/mcL    Mono # 0.77 0.1 - 1.3 x10(3)/mcL    Eos # 0.08 0 - 0.9 x10(3)/mcL    Baso # 0.03 <=0.2 x10(3)/mcL    IG# 0.03 0 - 0.04 x10(3)/mcL    IG% 0.3 %    NRBC% 0.0 %     "    Psychiatric Mental Status Exam:  General Appearance: appears stated age, dressed in hospital garb, lying in bed, in no acute distress  Arousal: alert  Behavior: pleasant, polite  Movements and Motor Activity: no abnormal involuntary movements noted; no tics, no tremors, no akathisia, no dystonia, no evidence of tardive dyskinesia; no psychomotor agitation or retardation  Orientation: oriented to person, place, time, and situation  Speech: normal rate, rhythm, volume, tone and pitch  Mood: "Less anxious"  Affect:  fair range  Thought Process: linear  Associations: no loosening of associations  Thought Content and Perceptions: no suicidal or homicidal ideation, no auditory or visual hallucinations, no paranoid ideation, no ideas of reference, no evidence of delusions or psychosis  Recent and Remote Memory: recent memory intact, remote memory intact; per interview/observation with patient  Attention and Concentration: attentive to conversation; per interview/observation with patient  Fund of Knowledge: aware of current events; based on history, vocabulary, fund of knowledge, syntax, grammar, and content  Insight:  fair ; based on understanding of severity of illness and HPI  Judgment: fair; based on patient's behavior and HPI    ASSESSMENT/PLAN:   Problems Addressed/Diagnoses:  SUBSTANCE-RELATED DISORDERS; Opioid-Related Disorders; 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

## 2023-06-22 ENCOUNTER — PATIENT OUTREACH (OUTPATIENT)
Dept: ADMINISTRATIVE | Facility: CLINIC | Age: 78
End: 2023-06-22
Payer: MEDICARE

## 2023-06-22 NOTE — PROGRESS NOTES
C3 nurse attempted to contact Dax Goldmanloly Boone  for a TCC post hospital discharge follow up call. No answer. No voicemail available. Called emergency contact for wife Mariposa. No answer. Left voicemail with callback information. The patient does not have a scheduled HOSFU appointment noted. Unable to route message to PCP staff.

## 2023-06-23 NOTE — PROGRESS NOTES
C3 nurse spoke with patient's wife for a TCC post hospital discharge follow up call. The patient has a scheduled [facility] appointment with Dr. Newton on 06/26/2023.   Patient taking;  Praluent injections every 2 weeks

## 2023-06-25 ENCOUNTER — PATIENT MESSAGE (OUTPATIENT)
Dept: ADMINISTRATIVE | Facility: OTHER | Age: 78
End: 2023-06-25
Payer: MEDICARE

## 2023-11-16 DIAGNOSIS — E78.5 HYPERLIPIDEMIA, UNSPECIFIED HYPERLIPIDEMIA TYPE: Primary | ICD-10-CM

## 2023-11-16 RX ORDER — METHYLPREDNISOLONE SOD SUCC 125 MG
125 VIAL (EA) INJECTION
OUTPATIENT
Start: 2023-12-01

## 2023-11-16 RX ORDER — EPINEPHRINE 0.3 MG/.3ML
0.3 INJECTION SUBCUTANEOUS ONCE AS NEEDED
OUTPATIENT
Start: 2023-12-01

## 2023-11-16 RX ORDER — DIPHENHYDRAMINE HYDROCHLORIDE 50 MG/ML
50 INJECTION INTRAMUSCULAR; INTRAVENOUS ONCE AS NEEDED
OUTPATIENT
Start: 2023-12-01

## 2024-03-19 ENCOUNTER — HOSPITAL ENCOUNTER (EMERGENCY)
Facility: HOSPITAL | Age: 79
Discharge: HOME | End: 2024-03-19
Attending: STUDENT IN AN ORGANIZED HEALTH CARE EDUCATION/TRAINING PROGRAM
Payer: MEDICARE

## 2024-03-19 VITALS
BODY MASS INDEX: 23.8 KG/M2 | DIASTOLIC BLOOD PRESSURE: 64 MMHG | HEIGHT: 71 IN | HEART RATE: 79 BPM | OXYGEN SATURATION: 100 % | WEIGHT: 170 LBS | TEMPERATURE: 98 F | SYSTOLIC BLOOD PRESSURE: 114 MMHG | RESPIRATION RATE: 19 BRPM

## 2024-03-19 DIAGNOSIS — M62.838 MUSCLE SPASM: ICD-10-CM

## 2024-03-19 DIAGNOSIS — B34.9 ACUTE VIRAL SYNDROME: Primary | ICD-10-CM

## 2024-03-19 LAB
ABS NEUT (OLG): 5.12 X10(3)/MCL (ref 2.1–9.2)
ALBUMIN SERPL-MCNC: 3.6 G/DL (ref 3.4–4.8)
ALBUMIN/GLOB SERPL: 1.3 RATIO (ref 1.1–2)
ALP SERPL-CCNC: 94 UNIT/L (ref 40–150)
ALT SERPL-CCNC: 11 UNIT/L (ref 0–55)
APPEARANCE UR: CLEAR
AST SERPL-CCNC: 16 UNIT/L (ref 5–34)
BACTERIA #/AREA URNS AUTO: ABNORMAL /HPF
BASOPHILS NFR BLD MANUAL: 0.1 X10(3)/MCL (ref 0–0.2)
BASOPHILS NFR BLD MANUAL: 1 %
BILIRUB SERPL-MCNC: 0.4 MG/DL
BILIRUB UR QL STRIP.AUTO: NEGATIVE
BUN SERPL-MCNC: 15.6 MG/DL (ref 8.4–25.7)
CALCIUM SERPL-MCNC: 8.5 MG/DL (ref 8.8–10)
CHLORIDE SERPL-SCNC: 108 MMOL/L (ref 98–107)
CK SERPL-CCNC: 91 U/L (ref 30–200)
CO2 SERPL-SCNC: 25 MMOL/L (ref 23–31)
COLOR UR AUTO: ABNORMAL
CREAT SERPL-MCNC: 1.18 MG/DL (ref 0.73–1.18)
ERYTHROCYTE [DISTWIDTH] IN BLOOD BY AUTOMATED COUNT: 15.4 % (ref 11.5–17)
FLUAV AG UPPER RESP QL IA.RAPID: NOT DETECTED
FLUBV AG UPPER RESP QL IA.RAPID: NOT DETECTED
GFR SERPLBLD CREATININE-BSD FMLA CKD-EPI: >60 MLS/MIN/1.73/M2
GLOBULIN SER-MCNC: 2.7 GM/DL (ref 2.4–3.5)
GLUCOSE SERPL-MCNC: 96 MG/DL (ref 82–115)
GLUCOSE UR QL STRIP.AUTO: NORMAL
HCT VFR BLD AUTO: 46.5 % (ref 42–52)
HGB BLD-MCNC: 15.5 G/DL (ref 14–18)
INSTRUMENT WBC (OLG): 10.44 X10(3)/MCL
KETONES UR QL STRIP.AUTO: NEGATIVE
LEUKOCYTE ESTERASE UR QL STRIP.AUTO: NEGATIVE
LYMPHOCYTES NFR BLD MANUAL: 4.7 X10(3)/MCL
LYMPHOCYTES NFR BLD MANUAL: 45 %
MAGNESIUM SERPL-MCNC: 2.4 MG/DL (ref 1.6–2.6)
MCH RBC QN AUTO: 28.8 PG (ref 27–31)
MCHC RBC AUTO-ENTMCNC: 33.3 G/DL (ref 33–36)
MCV RBC AUTO: 86.4 FL (ref 80–94)
MONOCYTES NFR BLD MANUAL: 0.52 X10(3)/MCL (ref 0.1–1.3)
MONOCYTES NFR BLD MANUAL: 5 %
MUCOUS THREADS URNS QL MICRO: ABNORMAL /LPF
NEUTROPHILS NFR BLD MANUAL: 49 %
NITRITE UR QL STRIP.AUTO: NEGATIVE
NRBC BLD AUTO-RTO: 0 %
PH UR STRIP.AUTO: 6.5 [PH]
PLATELET # BLD AUTO: 186 X10(3)/MCL (ref 130–400)
PLATELET # BLD EST: NORMAL 10*3/UL
PMV BLD AUTO: 10.1 FL (ref 7.4–10.4)
POTASSIUM SERPL-SCNC: 3.8 MMOL/L (ref 3.5–5.1)
PROT SERPL-MCNC: 6.3 GM/DL (ref 5.8–7.6)
PROT UR QL STRIP.AUTO: NEGATIVE
RBC # BLD AUTO: 5.38 X10(6)/MCL (ref 4.7–6.1)
RBC #/AREA URNS AUTO: ABNORMAL /HPF
RBC MORPH BLD: NORMAL
RBC UR QL AUTO: NEGATIVE
RSV A 5' UTR RNA NPH QL NAA+PROBE: NOT DETECTED
SARS-COV-2 RNA RESP QL NAA+PROBE: NOT DETECTED
SODIUM SERPL-SCNC: 139 MMOL/L (ref 136–145)
SP GR UR STRIP.AUTO: 1.02 (ref 1–1.03)
SQUAMOUS #/AREA URNS LPF: ABNORMAL /HPF
UROBILINOGEN UR STRIP-ACNC: NORMAL
WBC # SPEC AUTO: 10.44 X10(3)/MCL (ref 4.5–11.5)
WBC #/AREA URNS AUTO: ABNORMAL /HPF

## 2024-03-19 PROCEDURE — 85027 COMPLETE CBC AUTOMATED: CPT | Performed by: EMERGENCY MEDICINE

## 2024-03-19 PROCEDURE — 82550 ASSAY OF CK (CPK): CPT | Performed by: EMERGENCY MEDICINE

## 2024-03-19 PROCEDURE — 81001 URINALYSIS AUTO W/SCOPE: CPT | Performed by: EMERGENCY MEDICINE

## 2024-03-19 PROCEDURE — 83735 ASSAY OF MAGNESIUM: CPT | Performed by: EMERGENCY MEDICINE

## 2024-03-19 PROCEDURE — 96361 HYDRATE IV INFUSION ADD-ON: CPT

## 2024-03-19 PROCEDURE — 0241U COVID/RSV/FLU A&B PCR: CPT | Performed by: STUDENT IN AN ORGANIZED HEALTH CARE EDUCATION/TRAINING PROGRAM

## 2024-03-19 PROCEDURE — 25000003 PHARM REV CODE 250: Mod: JZ,JG | Performed by: STUDENT IN AN ORGANIZED HEALTH CARE EDUCATION/TRAINING PROGRAM

## 2024-03-19 PROCEDURE — 96365 THER/PROPH/DIAG IV INF INIT: CPT

## 2024-03-19 PROCEDURE — 99284 EMERGENCY DEPT VISIT MOD MDM: CPT | Mod: 25

## 2024-03-19 PROCEDURE — 80053 COMPREHEN METABOLIC PANEL: CPT | Performed by: EMERGENCY MEDICINE

## 2024-03-19 PROCEDURE — 63600175 PHARM REV CODE 636 W HCPCS: Performed by: STUDENT IN AN ORGANIZED HEALTH CARE EDUCATION/TRAINING PROGRAM

## 2024-03-19 RX ORDER — CALCIUM GLUCONATE 20 MG/ML
1 INJECTION, SOLUTION INTRAVENOUS
Status: COMPLETED | OUTPATIENT
Start: 2024-03-19 | End: 2024-03-19

## 2024-03-19 RX ADMIN — CALCIUM GLUCONATE 1 G: 20 INJECTION, SOLUTION INTRAVENOUS at 09:03

## 2024-03-19 RX ADMIN — SODIUM CHLORIDE, POTASSIUM CHLORIDE, SODIUM LACTATE AND CALCIUM CHLORIDE 1000 ML: 600; 310; 30; 20 INJECTION, SOLUTION INTRAVENOUS at 07:03

## 2024-03-19 NOTE — ED PROVIDER NOTES
"Encounter Date: 3/19/2024    SCRIBE #1 NOTE: I, Carmella Kent, am scribing for, and in the presence of,  Abdiel Barillas MD. I have scribed the following portions of the note - Other sections scribed: HPI, ROS, PE, MDM.       History     Chief Complaint   Patient presents with    Spasms     Pt arrives via [facility], EMS / Pt reports pt is having full body muscle "fasciculating" muscle, pt reports having this problem for weeks starting in the left leg , spreading to the right leg and stomach. Pt reports most severe today.      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  The pt also says he cannot sleep and that he is off balance and cold.  Pt's wife at, bedside, says that he had boils under his arm and was started on Doxycycline on Friday.  She also says that he has a cough and runny nose and low grade fever.  She says that he was recently taken off of gabapentin.    The history is provided by the patient and the spouse. No  was used.     Review of patient's allergies indicates:   Allergen Reactions    Lamictal [lamotrigine] Rash     Severe whole body rash    Quetiapine Other (See Comments)     patietn reports taking medication then having trouble swallowing after    Ability to swallow    Clindamycin      Burning stomach , brain fog, felt like passing  out     Past Medical History:   Diagnosis Date    Debility     Hypertension      No past surgical history on file.  No family history on file.  Social History     Tobacco Use    Smoking status: Never    Smokeless tobacco: Never     Review of Systems   Constitutional:  Positive for fever.   HENT:  Positive for rhinorrhea.    Respiratory:  Positive for cough.    Neurological:  Positive for tremors.       Physical Exam     Initial Vitals [03/19/24 0405]   BP Pulse Resp Temp SpO2   114/64 79 18 97.5 °F (36.4 °C) 100 %      MAP       --         Physical " Exam    Constitutional: He appears well-developed and well-nourished. He is not diaphoretic. No distress.   HENT:   Head: Normocephalic and atraumatic.   Right Ear: External ear normal.   Left Ear: External ear normal.   Nose: Nose normal.   Eyes: EOM are normal. Pupils are equal, round, and reactive to light. Right eye exhibits no discharge. Left eye exhibits no discharge.   Cardiovascular:  Normal rate, regular rhythm and normal heart sounds.     Exam reveals no gallop and no friction rub.       No murmur heard.  Pulmonary/Chest: Effort normal and breath sounds normal. No respiratory distress. He has no wheezes. He has no rhonchi. He has no rales. He exhibits no tenderness.   Abdominal: Abdomen is soft. Bowel sounds are normal. He exhibits no distension and no mass. There is no abdominal tenderness. There is no rebound and no guarding.   Musculoskeletal:         General: No edema. Normal range of motion.     Neurological: He is alert and oriented to person, place, and time. No cranial nerve deficit or sensory deficit.   No abnormal movements or fasciculations.   Skin: Skin is warm and dry. Capillary refill takes less than 2 seconds.         ED Course   Procedures  Labs Reviewed   COMPREHENSIVE METABOLIC PANEL - Abnormal; Notable for the following components:       Result Value    Chloride 108 (*)     Calcium Level Total 8.5 (*)     All other components within normal limits   MANUAL DIFFERENTIAL - Abnormal; Notable for the following components:    Lymphs Abs 4.698 (*)     All other components within normal limits   URINALYSIS, REFLEX TO URINE CULTURE - Abnormal; Notable for the following components:    Mucous, UA Trace (*)     All other components within normal limits   MAGNESIUM - Normal   CK - Normal   COVID/RSV/FLU A&B PCR - Normal    Narrative:     The Xpert Xpress SARS-CoV-2/FLU/RSV plus is a rapid, multiplexed real-time PCR test intended for the simultaneous qualitative detection and differentiation of  SARS-CoV-2, Influenza A, Influenza B, and respiratory syncytial virus (RSV) viral RNA in either nasopharyngeal swab or nasal swab specimens.         CBC W/ AUTO DIFFERENTIAL    Narrative:     The following orders were created for panel order CBC auto differential.  Procedure                               Abnormality         Status                     ---------                               -----------         ------                     CBC with Differential[[id number]]                            Final result               Manual Differential[[id number]]          Abnormal            Final result                 Please view results for these tests on the individual orders.   CBC WITH DIFFERENTIAL          Imaging Results              X-Ray Chest AP Portable (Final result)  Result time 03/19/24 08:08:00      Final result by Deysi Humphries MD (03/19/24 08:08:00)                   Impression:      No acute cardiopulmonary abnormality.      Electronically signed by: Deysi Humphries  Date:    03/19/2024  Time:    08:08               Narrative:    EXAMINATION:  XR CHEST AP PORTABLE    CLINICAL HISTORY:  cough;    TECHNIQUE:  Single frontal view of the chest was performed.    COMPARISON:  06/19/2023    FINDINGS:  LINES AND TUBES: None    MEDIASTINUM AND DARELL: The cardiac silhouette is normal.    LUNGS: No lobar consolidation. No edema.    PLEURA:No pleural effusion. No pneumothorax.    BONES: No acute osseous abnormality.                                       Medications   calcium gluconate 1 g in NS IVPB (premixed) (1 g Intravenous New Bag 3/19/24 0925)   lactated ringers bolus 1,000 mL (1,000 mLs Intravenous New Bag 3/19/24 0750)     Medical Decision Making  Differential diagnosis includes, but is not limited to generalized weakness, tremors, electrolyte abnormality, renal dysfunction, CVA, TIA, seizure.      Patient was awake alert well-appearing.  Resting comfortably.  NAD.  No fasciculations noted on my exam.  He  was a multitude of complaints all of which are well documented in the past as his chronic complaints including insomnia.  Wife does report cough, low-grade fever.  He was flu COVID RSV negative here in the emergency department.  Chest x-ray clear.  Had a lengthy discussion with both patient and wife but negative workup here in the emergency department plan for discharge home he continues to complain about his insomnia, in his neuropathy.  Once again these are chronic.  Suitable for discharge home from emergency department standpoint    Amount and/or Complexity of Data Reviewed  Independent Historian: spouse     Details:  Pt's wife, at bedside, says that he had boils under his arm and was started on Doxycycline on Friday.  She also says that he has a cough and runny nose and low grade fever.  She says that he was recently taken off of gabapentin.  External Data Reviewed: notes.     Details: See ED course  Labs: ordered. Decision-making details documented in ED Course.  Radiology: ordered and independent interpretation performed. Decision-making details documented in ED Course.    Risk  OTC drugs.  Prescription drug management.            Scribe Attestation:   Scribe #1: I performed the above scribed service and the documentation accurately describes the services I performed. I attest to the accuracy of the note.    Attending Attestation:           Physician Attestation for Scribe:  Physician Attestation Statement for Scribe #1: I, Abdiel Barillas MD, reviewed documentation, as scribed by Carmella Kent in my presence, and it is both accurate and complete.             ED Course as of 03/19/24 1004   Tue Mar 19, 2024   8158 Chart review reveals note from neurologist Dr. MADISYN Estrada:  Evidently patient with a history of functional movement disorder diagnosis independently by 3 previous academic movement disorder specialist, at [facility], [facility], [facility].  History of small fiber  peripheral neuropathy.  Chronic pain.  Chronic insomnia, polypharmacy, mild cognitive impairment.  Per note he chronically complains of fasciculations of the whole-body, chattering of teeth, difficulty holding of 4, electrolyte handwriting, headaches, burning sensation to lips and tongue, decreased ability to taste food.  He was encouraged to continue his psychological therapy.  Neurology evidently is limited from their standpoint of what further treatment they can provide.  Per note the [facility] movement disorder specialist referred him to continue with psychiatry or psychology for further evaluation management.  Note from psych from 06/21/2023.  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  Bipolar.  General anxiety disorder [MM]   0722 Urinalysis, Reflex to Urine Culture(!)  No evidence of urinary tract infection. [MM]   0722 CPK: 91 [MM]   0723 Magnesium : 2.40 [MM]   0723 CBC auto differential(!)  No leukocytosis no anemia. [MM]   0723 Comprehensive metabolic panel(!)  No significant electrolyte abnormality or renal dysfunction.  Mildly decreased calcium. [MM]   0815 X-Ray Chest AP Portable  Negative for acute. [MM]   0948 SARS-CoV2 (COVID-19) Qualitative PCR: Not Detected [MM]   0949 RSV Ag by Molecular Method: Not Detected [MM]   0949 Influenza B, Molecular: Not Detected [MM]   0949 Influenza A, Molecular: Not Detected [MM]   0959 Re-evaluation patient resting comfortably.  Had a lengthy discussion with the patient concerning his normal labs today.  Normal chest x-ray.  Flu COVID RSV negative.  No leukocytosis no anemia only electrolyte abnormality was mild hypocalcemia.  Was corrected here in the emergency department.  Patient continues to discuss his chronic issues of neuropathy, insomnia.  Offered to refer him to a different neurologist being as he seems to be unhappy with his current 1.  He was tepid about that.  Of note in the computer documented that his  "complaints today are chronic in nature.  Including his insomnia, neuropathy, "fasciculations".  No fasciculations noted here in the emergency department.  He was not hypothermic.  His vitals are stable.  I believe this time he was suitable for discharge home.  No emergent or urgent condition to address here in the emergency department.  We will refer to Neurology.  He was medications include Suboxone, clonazepam, Depakote, trazodone.  I am uncomfortable prescribing anything to help him sleep due to already having multiple sedating medications. [MM]      ED Course User Index  [MM] Abdiel Barillas MD                           Clinical Impression:  Final diagnoses:  [B34.9] Acute viral syndrome (Primary)  [M62.838] Muscle spasm          ED Disposition Condition    Discharge Stable          ED Prescriptions    None       Follow-up Information       Follow up With Specialties Details Why Contact Info    Yefri Smith MD Internal Medicine Call   [address]  [address]  [place] [facility] [address]  [phone]      Robert Sullivan MD Neurology, Sleep Medicine Call  [address]  [address]  [facility] [address]  [phone]      Karli Gong MD Neurology Call  [address]  [address]  [facility] [address]  [phone]               Abdiel Barillas MD  03/19/24 1004    "

## 2024-03-19 NOTE — DISCHARGE INSTRUCTIONS

## 2024-05-01 RX ORDER — DIPHENHYDRAMINE HYDROCHLORIDE 50 MG/ML
50 INJECTION INTRAMUSCULAR; INTRAVENOUS ONCE AS NEEDED
OUTPATIENT
Start: 2024-05-02

## 2024-05-01 RX ORDER — METHYLPREDNISOLONE SOD SUCC 125 MG
125 VIAL (EA) INJECTION
OUTPATIENT
Start: 2024-05-02

## 2024-05-01 RX ORDER — EPINEPHRINE 0.3 MG/.3ML
0.3 INJECTION SUBCUTANEOUS ONCE AS NEEDED
OUTPATIENT
Start: 2024-05-02

## 2024-07-15 ENCOUNTER — INFUSION (OUTPATIENT)
Dept: INFUSION THERAPY | Facility: HOSPITAL | Age: 79
End: 2024-07-15
Attending: INTERNAL MEDICINE
Payer: MEDICARE

## 2024-07-15 VITALS
OXYGEN SATURATION: 99 % | DIASTOLIC BLOOD PRESSURE: 68 MMHG | TEMPERATURE: 98 F | HEART RATE: 90 BPM | SYSTOLIC BLOOD PRESSURE: 128 MMHG

## 2024-07-15 DIAGNOSIS — E78.5 HYPERLIPIDEMIA, UNSPECIFIED HYPERLIPIDEMIA TYPE: Primary | ICD-10-CM

## 2024-07-15 PROCEDURE — 63600175 PHARM REV CODE 636 W HCPCS: Mod: JZ,TB | Performed by: INTERNAL MEDICINE

## 2024-07-15 PROCEDURE — 96372 THER/PROPH/DIAG INJ SC/IM: CPT

## 2024-07-15 RX ORDER — DIPHENHYDRAMINE HYDROCHLORIDE 50 MG/ML
50 INJECTION INTRAMUSCULAR; INTRAVENOUS ONCE AS NEEDED
Status: DISCONTINUED | OUTPATIENT
Start: 2024-07-15 | End: 2024-07-15 | Stop reason: HOSPADM

## 2024-07-15 RX ORDER — DIPHENHYDRAMINE HYDROCHLORIDE 50 MG/ML
50 INJECTION INTRAMUSCULAR; INTRAVENOUS ONCE AS NEEDED
OUTPATIENT
Start: 2024-12-29

## 2024-07-15 RX ORDER — EPINEPHRINE 0.3 MG/.3ML
0.3 INJECTION SUBCUTANEOUS ONCE AS NEEDED
Status: DISCONTINUED | OUTPATIENT
Start: 2024-07-15 | End: 2024-07-15 | Stop reason: HOSPADM

## 2024-07-15 RX ORDER — METHYLPREDNISOLONE SOD SUCC 125 MG
125 VIAL (EA) INJECTION
OUTPATIENT
Start: 2024-12-29

## 2024-07-15 RX ORDER — METHYLPREDNISOLONE SOD SUCC 125 MG
125 VIAL (EA) INJECTION ONCE AS NEEDED
Status: DISCONTINUED | OUTPATIENT
Start: 2024-07-15 | End: 2024-07-15 | Stop reason: HOSPADM

## 2024-07-15 RX ORDER — EPINEPHRINE 0.3 MG/.3ML
0.3 INJECTION SUBCUTANEOUS ONCE AS NEEDED
OUTPATIENT
Start: 2024-12-29

## 2024-07-15 RX ADMIN — INCLISIRAN 284 MG: 284 INJECTION, SOLUTION SUBCUTANEOUS at 11:07

## 2024-07-18 ENCOUNTER — HOSPITAL ENCOUNTER (EMERGENCY)
Facility: HOSPITAL | Age: 79
Discharge: HOME | End: 2024-07-18
Attending: STUDENT IN AN ORGANIZED HEALTH CARE EDUCATION/TRAINING PROGRAM
Payer: COMMERCIAL

## 2024-07-18 VITALS
TEMPERATURE: 98 F | HEIGHT: 71 IN | BODY MASS INDEX: 23.8 KG/M2 | OXYGEN SATURATION: 99 % | SYSTOLIC BLOOD PRESSURE: 155 MMHG | HEART RATE: 90 BPM | RESPIRATION RATE: 19 BRPM | DIASTOLIC BLOOD PRESSURE: 80 MMHG | WEIGHT: 170 LBS

## 2024-07-18 DIAGNOSIS — S16.1XXA STRAIN OF NECK MUSCLE, INITIAL ENCOUNTER: Primary | ICD-10-CM

## 2024-07-18 PROCEDURE — 96372 THER/PROPH/DIAG INJ SC/IM: CPT | Performed by: PHYSICIAN ASSISTANT

## 2024-07-18 PROCEDURE — 99285 EMERGENCY DEPT VISIT HI MDM: CPT | Mod: 25

## 2024-07-18 PROCEDURE — 63600175 PHARM REV CODE 636 W HCPCS: Performed by: PHYSICIAN ASSISTANT

## 2024-07-18 RX ORDER — CLONAZEPAM 0.5 MG/1
0.5 TABLET ORAL
Status: DISCONTINUED | OUTPATIENT
Start: 2024-07-18 | End: 2024-07-18 | Stop reason: HOSPADM

## 2024-07-18 RX ORDER — KETOROLAC TROMETHAMINE 30 MG/ML
30 INJECTION, SOLUTION INTRAMUSCULAR; INTRAVENOUS
Status: COMPLETED | OUTPATIENT
Start: 2024-07-18 | End: 2024-07-18

## 2024-07-18 RX ORDER — TIZANIDINE 4 MG/1
4 TABLET ORAL EVERY 8 HOURS PRN
Qty: 21 TABLET | Refills: 0 | Status: SHIPPED | OUTPATIENT
Start: 2024-07-18 | End: 2024-07-25

## 2024-07-18 RX ADMIN — KETOROLAC TROMETHAMINE 30 MG: 30 INJECTION, SOLUTION INTRAMUSCULAR at 03:07

## 2024-07-18 NOTE — ED NOTES
Patient reports that he would also like to be checked out for his left leg. States he has frequent muscle spasms to posterior calf muscle. Had an appointment with PCP but states  Was not available when patient showed up.
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
## 2024-10-15 ENCOUNTER — INFUSION (OUTPATIENT)
Dept: INFUSION THERAPY | Facility: HOSPITAL | Age: 79
End: 2024-10-15
Attending: NURSE PRACTITIONER
Payer: MEDICARE

## 2024-10-15 VITALS
SYSTOLIC BLOOD PRESSURE: 129 MMHG | RESPIRATION RATE: 18 BRPM | HEIGHT: 71 IN | WEIGHT: 145.31 LBS | OXYGEN SATURATION: 99 % | DIASTOLIC BLOOD PRESSURE: 70 MMHG | TEMPERATURE: 98 F | HEART RATE: 67 BPM | BODY MASS INDEX: 20.34 KG/M2

## 2024-10-15 DIAGNOSIS — E78.5 HYPERLIPIDEMIA, UNSPECIFIED HYPERLIPIDEMIA TYPE: Primary | ICD-10-CM

## 2024-10-15 PROCEDURE — 96372 THER/PROPH/DIAG INJ SC/IM: CPT

## 2024-10-15 PROCEDURE — 63600175 PHARM REV CODE 636 W HCPCS: Mod: JZ,TB | Performed by: INTERNAL MEDICINE

## 2024-10-15 RX ORDER — EPINEPHRINE 0.3 MG/.3ML
0.3 INJECTION SUBCUTANEOUS ONCE AS NEEDED
Status: CANCELLED | OUTPATIENT
Start: 2025-01-01

## 2024-10-15 RX ORDER — DIPHENHYDRAMINE HYDROCHLORIDE 50 MG/ML
50 INJECTION INTRAMUSCULAR; INTRAVENOUS ONCE AS NEEDED
Status: DISCONTINUED | OUTPATIENT
Start: 2024-10-15 | End: 2024-10-15 | Stop reason: HOSPADM

## 2024-10-15 RX ORDER — METHYLPREDNISOLONE SOD SUCC 125 MG
125 VIAL (EA) INJECTION
Status: DISCONTINUED | OUTPATIENT
Start: 2024-10-15 | End: 2024-10-15 | Stop reason: HOSPADM

## 2024-10-15 RX ORDER — EPINEPHRINE 0.3 MG/.3ML
0.3 INJECTION SUBCUTANEOUS ONCE AS NEEDED
Status: DISCONTINUED | OUTPATIENT
Start: 2024-10-15 | End: 2024-10-15 | Stop reason: HOSPADM

## 2024-10-15 RX ORDER — METHYLPREDNISOLONE SOD SUCC 125 MG
125 VIAL (EA) INJECTION
Status: CANCELLED | OUTPATIENT
Start: 2025-01-01

## 2024-10-15 RX ORDER — DIPHENHYDRAMINE HYDROCHLORIDE 50 MG/ML
50 INJECTION INTRAMUSCULAR; INTRAVENOUS ONCE AS NEEDED
Status: CANCELLED | OUTPATIENT
Start: 2025-01-01

## 2024-10-15 RX ADMIN — INCLISIRAN 284 MG: 284 INJECTION, SOLUTION SUBCUTANEOUS at 11:10

## 2025-04-15 ENCOUNTER — INFUSION (OUTPATIENT)
Dept: INFUSION THERAPY | Facility: HOSPITAL | Age: 80
End: 2025-04-15
Attending: NURSE PRACTITIONER
Payer: MEDICARE

## 2025-04-15 VITALS
DIASTOLIC BLOOD PRESSURE: 72 MMHG | RESPIRATION RATE: 18 BRPM | OXYGEN SATURATION: 98 % | TEMPERATURE: 98 F | HEART RATE: 71 BPM | SYSTOLIC BLOOD PRESSURE: 114 MMHG | BODY MASS INDEX: 20.49 KG/M2 | WEIGHT: 151.31 LBS | HEIGHT: 72 IN

## 2025-04-15 DIAGNOSIS — E78.5 HYPERLIPIDEMIA, UNSPECIFIED HYPERLIPIDEMIA TYPE: Primary | ICD-10-CM

## 2025-04-15 PROCEDURE — 96372 THER/PROPH/DIAG INJ SC/IM: CPT

## 2025-04-15 PROCEDURE — 63600175 PHARM REV CODE 636 W HCPCS: Mod: JZ,TB | Performed by: INTERNAL MEDICINE

## 2025-04-15 RX ADMIN — INCLISIRAN 284 MG: 284 INJECTION, SOLUTION SUBCUTANEOUS at 10:04

## 2025-05-15 RX ORDER — CHOLECALCIFEROL (VITAMIN D3) 25 MCG
TABLET ORAL
COMMUNITY

## 2025-05-15 RX ORDER — BACLOFEN 10 MG/1
10 TABLET ORAL 3 TIMES DAILY
COMMUNITY

## 2025-05-15 RX ORDER — FOLIC ACID 1 MG/1
1 TABLET ORAL DAILY
COMMUNITY

## 2025-05-15 RX ORDER — SUCRALFATE 1 G/1
1 TABLET ORAL 4 TIMES DAILY
COMMUNITY

## 2025-05-15 RX ORDER — MULTIVITAMIN
1 TABLET ORAL DAILY
COMMUNITY

## 2025-05-15 RX ORDER — OXYCODONE AND ACETAMINOPHEN 7.5; 325 MG/1; MG/1
1 TABLET ORAL EVERY 4 HOURS PRN
COMMUNITY

## 2025-05-15 RX ORDER — INCLISIRAN 284 MG/1.5ML
INJECTION, SOLUTION SUBCUTANEOUS
COMMUNITY
Start: 2024-06-01

## 2025-05-15 RX ORDER — SERTRALINE HYDROCHLORIDE 50 MG/1
50 TABLET, FILM COATED ORAL DAILY
COMMUNITY

## 2025-05-20 ENCOUNTER — ANESTHESIA EVENT (OUTPATIENT)
Facility: HOSPITAL | Age: 80
End: 2025-05-20
Payer: MEDICARE

## 2025-05-20 ENCOUNTER — ANESTHESIA (OUTPATIENT)
Facility: HOSPITAL | Age: 80
End: 2025-05-20
Payer: MEDICARE

## 2025-05-20 ENCOUNTER — HOSPITAL ENCOUNTER (OUTPATIENT)
Facility: HOSPITAL | Age: 80
Discharge: HOME | End: 2025-05-20
Attending: OPHTHALMOLOGY | Admitting: OPHTHALMOLOGY
Payer: MEDICARE

## 2025-05-20 VITALS
BODY MASS INDEX: 20.86 KG/M2 | HEART RATE: 86 BPM | DIASTOLIC BLOOD PRESSURE: 87 MMHG | OXYGEN SATURATION: 98 % | HEIGHT: 72 IN | TEMPERATURE: 98 F | SYSTOLIC BLOOD PRESSURE: 148 MMHG | WEIGHT: 154 LBS

## 2025-05-20 DIAGNOSIS — H26.9 CATARACT: ICD-10-CM

## 2025-05-20 PROCEDURE — 63600175 PHARM REV CODE 636 W HCPCS: Performed by: NURSE ANESTHETIST, CERTIFIED REGISTERED

## 2025-05-20 PROCEDURE — 71000015 HC POSTOP RECOV 1ST HR: Performed by: OPHTHALMOLOGY

## 2025-05-20 PROCEDURE — V2632 POST CHMBR INTRAOCULAR LENS: HCPCS | Performed by: OPHTHALMOLOGY

## 2025-05-20 PROCEDURE — 63600175 PHARM REV CODE 636 W HCPCS: Performed by: OPHTHALMOLOGY

## 2025-05-20 PROCEDURE — 27201423 OPTIME MED/SURG SUP & DEVICES STERILE SUPPLY: Performed by: OPHTHALMOLOGY

## 2025-05-20 PROCEDURE — 37000008 HC ANESTHESIA 1ST 15 MINUTES: Performed by: OPHTHALMOLOGY

## 2025-05-20 PROCEDURE — 25000003 PHARM REV CODE 250

## 2025-05-20 PROCEDURE — 36000707: Performed by: OPHTHALMOLOGY

## 2025-05-20 PROCEDURE — 25000003 PHARM REV CODE 250: Performed by: OPHTHALMOLOGY

## 2025-05-20 PROCEDURE — 37000009 HC ANESTHESIA EA ADD 15 MINS: Performed by: OPHTHALMOLOGY

## 2025-05-20 PROCEDURE — 36000706: Performed by: OPHTHALMOLOGY

## 2025-05-20 DEVICE — TECNIS SIMPLICITY TECNIS EYHANCE U 20.5D
Type: IMPLANTABLE DEVICE | Site: EYE | Status: FUNCTIONAL
Brand: TECNIS SIMPLICITY

## 2025-05-20 RX ORDER — IPRATROPIUM BROMIDE AND ALBUTEROL SULFATE 2.5; .5 MG/3ML; MG/3ML
3 SOLUTION RESPIRATORY (INHALATION)
Status: DISCONTINUED | OUTPATIENT
Start: 2025-05-20 | End: 2025-05-20 | Stop reason: HOSPADM

## 2025-05-20 RX ORDER — MEPERIDINE HYDROCHLORIDE 25 MG/ML
12.5 INJECTION INTRAMUSCULAR; INTRAVENOUS; SUBCUTANEOUS EVERY 10 MIN PRN
Status: DISCONTINUED | OUTPATIENT
Start: 2025-05-20 | End: 2025-05-20 | Stop reason: HOSPADM

## 2025-05-20 RX ORDER — MIDAZOLAM HYDROCHLORIDE 2 MG/2ML
2 INJECTION, SOLUTION INTRAMUSCULAR; INTRAVENOUS ONCE AS NEEDED
Status: DISCONTINUED | OUTPATIENT
Start: 2025-05-20 | End: 2025-05-20 | Stop reason: HOSPADM

## 2025-05-20 RX ORDER — MIDAZOLAM HYDROCHLORIDE 1 MG/ML
INJECTION INTRAMUSCULAR; INTRAVENOUS
Status: DISCONTINUED | OUTPATIENT
Start: 2025-05-20 | End: 2025-05-20

## 2025-05-20 RX ORDER — TROPICAMIDE 10 MG/ML
2 SOLUTION/ DROPS OPHTHALMIC
Status: COMPLETED | OUTPATIENT
Start: 2025-05-20 | End: 2025-05-20

## 2025-05-20 RX ORDER — GLUCAGON 1 MG
1 KIT INJECTION
Status: DISCONTINUED | OUTPATIENT
Start: 2025-05-20 | End: 2025-05-20 | Stop reason: HOSPADM

## 2025-05-20 RX ORDER — BUPIVACAINE HYDROCHLORIDE 7.5 MG/ML
0.1 INJECTION, SOLUTION EPIDURAL; RETROBULBAR
Status: COMPLETED | OUTPATIENT
Start: 2025-05-20 | End: 2025-05-20

## 2025-05-20 RX ORDER — MOXIFLOXACIN 5 MG/ML
1 SOLUTION/ DROPS OPHTHALMIC
Status: COMPLETED | OUTPATIENT
Start: 2025-05-20 | End: 2025-05-20

## 2025-05-20 RX ORDER — LIDOCAINE HYDROCHLORIDE 40 MG/ML
INJECTION, SOLUTION RETROBULBAR
Status: DISCONTINUED | OUTPATIENT
Start: 2025-05-20 | End: 2025-05-20 | Stop reason: HOSPADM

## 2025-05-20 RX ORDER — BUPIVACAINE HYDROCHLORIDE 7.5 MG/ML
INJECTION, SOLUTION EPIDURAL; RETROBULBAR
Status: DISCONTINUED | OUTPATIENT
Start: 2025-05-20 | End: 2025-05-20 | Stop reason: HOSPADM

## 2025-05-20 RX ORDER — EPINEPHRINE 1 MG/ML
INJECTION, SOLUTION, CONCENTRATE INTRAVENOUS
Status: DISCONTINUED | OUTPATIENT
Start: 2025-05-20 | End: 2025-05-20 | Stop reason: HOSPADM

## 2025-05-20 RX ORDER — ONDANSETRON 4 MG/1
8 TABLET, ORALLY DISINTEGRATING ORAL EVERY 6 HOURS PRN
Status: DISCONTINUED | OUTPATIENT
Start: 2025-05-20 | End: 2025-05-20 | Stop reason: HOSPADM

## 2025-05-20 RX ORDER — POVIDONE-IODINE 5 %
SOLUTION, NON-ORAL OPHTHALMIC (EYE)
Status: DISCONTINUED | OUTPATIENT
Start: 2025-05-20 | End: 2025-05-20 | Stop reason: HOSPADM

## 2025-05-20 RX ORDER — LIDOCAINE HYDROCHLORIDE 10 MG/ML
1 INJECTION, SOLUTION EPIDURAL; INFILTRATION; INTRACAUDAL; PERINEURAL ONCE
Status: DISCONTINUED | OUTPATIENT
Start: 2025-05-20 | End: 2025-05-20 | Stop reason: HOSPADM

## 2025-05-20 RX ORDER — LORAZEPAM 2 MG/ML
0.25 INJECTION INTRAMUSCULAR ONCE AS NEEDED
Status: DISCONTINUED | OUTPATIENT
Start: 2025-05-20 | End: 2025-05-20 | Stop reason: HOSPADM

## 2025-05-20 RX ORDER — ONDANSETRON HYDROCHLORIDE 2 MG/ML
4 INJECTION, SOLUTION INTRAVENOUS DAILY PRN
Status: DISCONTINUED | OUTPATIENT
Start: 2025-05-20 | End: 2025-05-20 | Stop reason: HOSPADM

## 2025-05-20 RX ORDER — PHENYLEPHRINE HYDROCHLORIDE 100 MG/ML
2 SOLUTION/ DROPS OPHTHALMIC
Status: COMPLETED | OUTPATIENT
Start: 2025-05-20 | End: 2025-05-20

## 2025-05-20 RX ADMIN — MIDAZOLAM HYDROCHLORIDE 1 MG: 1 INJECTION, SOLUTION INTRAMUSCULAR; INTRAVENOUS at 08:05

## 2025-05-20 RX ADMIN — MOXIFLOXACIN 1 DROP: 5 SOLUTION/ DROPS OPHTHALMIC at 07:05

## 2025-05-20 RX ADMIN — TROPICAMIDE 2 DROP: 10 SOLUTION/ DROPS OPHTHALMIC at 07:05

## 2025-05-20 RX ADMIN — PHENYLEPHRINE HYDROCHLORIDE 2 DROP: 100 SOLUTION/ DROPS OPHTHALMIC at 07:05

## 2025-05-20 RX ADMIN — MIDAZOLAM HYDROCHLORIDE 1 MG: 1 INJECTION, SOLUTION INTRAMUSCULAR; INTRAVENOUS at 07:05

## 2025-05-20 RX ADMIN — BUPIVACAINE HYDROCHLORIDE 0.75 MG: 7.5 INJECTION, SOLUTION EPIDURAL; RETROBULBAR at 07:05

## 2025-05-20 NOTE — OP NOTE
[facility] Surgical - Periop Services 2nd Floor  Operative Note      Date of Procedure: 5/20/2025     Procedure: Procedure(s) (LRB):  PHACOEMULSIFICATION, CATARACT, WITH IOL INSERTION    /////right eye; Catalys (Right)     Surgeons and Role:     * Robert Sanders MD - Primary    Assisting : HERNÁN Boykin    Pre-Operative Diagnosis: Combined forms of age-related cataract of right eye [H25.811]    Post-Operative Diagnosis: Post-Op Diagnosis Codes:     * Combined forms of age-related cataract of right eye [H25.811]    Anesthesia: Monitor Anesthesia Care    Operative Findings (including complications, if any):     Description of Technical Procedures: The patient was brought to the Catalys laser.  A time out was performed.  The capsulotomy, lens fragmentation and limbal relaxing incisions were completed.  Then the patient was brought into the operating suite, where the patient was correctly identified as was the operative eye via timeout.  The patient was prepped and draped in a sterile ophthalmic fashion.  A lid speculum was placed in the operative eye and the microscope was brought into place.  A 1.0 mm paracentesis was then made at (12) o'clock.  The anterior chamber was filled with Endocoat.  A (temporal) clear corneal incision was made with a 2.4 mm keratome blade.  A 6 mm corneal marking ring was used to meenakshi the cornea centered over the visual axis.  A 5.00 mm continuous curvilinear capsulorhexis was fashioned using a cystotome and microcapsular forceps.  Hydrodissection and hydrodelineation was performed with upreserved 1% Xylocaine.  The nucleus was then phacoemulsified with the Abbott phacoemulsification hand-piece with a total of (1) EFX.  The cortex was then removed with the I/A hand-piece.  The lens model (DIB00) with a power of (20.5) was placed in the capsular bag.  The Helon was then removed from the eye with the I/A hand piece.  The anterior chamber was inflated and the wounds were hydrated  with BSS.  The wounds were checked with Weck-Lizeth sponges and found to be watertight.  The lid speculum was removed and topical antibiotics were placed on the operative eye.  The patient was brought to PACU in good condition.

## 2025-05-20 NOTE — ANESTHESIA PREPROCEDURE EVALUATION
05/20/2025  Dax Boone is a 79 y.o., male with CAD status post stent (2018) with preserved EF presents as an outpatient for right cataract extraction with catalyst laser.  Patient notes recent stress test which was reassuring.      Transthoracic echo March 25   EF 55% grade 1 diastolic dysfunction   Mild TR   RVSP 31    Last 3 sets of Vitals        4/15/2025    10:44 AM 5/15/2025     8:46 AM 5/20/2025     6:42 AM   Vitals - 1 value per visit   SYSTOLIC 114  145   DIASTOLIC 72  77   Pulse 71  68   Temp 36.6 °C (97.9 °F)  36.5 °C (97.7 °F)   Resp 18     SPO2 98 %  96 %   Weight (lb) 151.3 154    Weight (kg) 68.629 69.854    Height 6' (1.829 m) 6' (1.829 m)    BMI (Calculated) 20.5 20.9    Pain Score Ten           Chemistry        Component Value Date/Time     03/27/2025 0554     04/21/2008 1705    K 4.3 03/27/2025 0554    K 4.5 04/21/2008 1705     03/27/2025 0554     04/21/2008 1705    CO2 28 03/27/2025 0554    CO2 25 04/21/2008 1705    BUN 13 03/27/2025 0554    BUN 23 04/21/2008 1705    CREATININE 1.26 (H) 03/27/2025 0554    CREATININE 1.2 04/21/2008 1705    GLU 96 03/19/2024 0442    GLU 97 04/21/2008 1705        Component Value Date/Time    CALCIUM 8.8 03/27/2025 0554    CALCIUM 9.5 04/21/2008 1705    ALKPHOS 94 03/19/2024 0442    ALKPHOS 75 04/21/2008 1705    AST 16 03/19/2024 0442    AST 23 04/21/2008 1705    ALT 11 03/19/2024 0442    ALT 32 04/21/2008 1705    BILITOT 0.4 03/19/2024 0442    BILITOT 0.6 04/21/2008 1705    ESTGFRAFRICA 59 03/27/2025 0554    EGFRNONAA 54 04/04/2022 1257      Pre-op Assessment    I have reviewed the Patient Summary Reports.    I have reviewed the NPO Status.   I have reviewed the Medications.     Review of Systems  Anesthesia Hx:               Denies Personal Hx of Anesthesia complications.                    Social:  Non-Smoker        Cardiovascular:     Hypertension                  Functional Capacity unable to determine                         Pulmonary:        Sleep Apnea                Renal/:  Chronic Renal Disease, CKD                Hepatic/GI:     GERD, well controlled                    Physical Exam  General: Well nourished, Cooperative, Alert and Oriented    Airway:  Mallampati: II   Mouth Opening: Normal  TM Distance: Normal  Tongue: Normal  Neck ROM: Normal ROM    Dental:  Intact    Chest/Lungs:  Clear to auscultation, Normal Respiratory Rate    Heart:  Rate: Normal  Rhythm: Regular Rhythm        Anesthesia Plan  Type of Anesthesia, risks & benefits discussed:    Anesthesia Type: Gen Natural Airway  Intra-op Monitoring Plan: Standard ASA Monitors  Induction:  IV  Informed Consent: Informed consent signed with the Patient and all parties understand the risks and agree with anesthesia plan.  All questions answered.   ASA Score: 3  Day of Surgery Review of History & Physical: H&P Update referred to the surgeon/provider.    Ready For Surgery From Anesthesia Perspective.     .

## 2025-05-20 NOTE — DISCHARGE SUMMARY
[facility] - Periop Services 2nd Floor  Discharge Note  Short Stay    Procedure(s) (LRB):  PHACOEMULSIFICATION, CATARACT, WITH IOL INSERTION    /////right eye; Catalys (Right)      OUTCOME: Patient tolerated treatment/procedure well without complication and is now ready for discharge.    DISPOSITION: Home or Self Care    FINAL DIAGNOSIS:  Pseudophakia Right Eye    FOLLOWUP: In clinic    DISCHARGE INSTRUCTIONS:  Review discharge instructions in patients chart     TIME SPENT ON DISCHARGE: 5 minutes

## 2025-05-20 NOTE — ANESTHESIA POSTPROCEDURE EVALUATION
Anesthesia Post Evaluation    Patient: Dax Boone    Procedure(s) Performed: Procedure(s) (LRB):  PHACOEMULSIFICATION, CATARACT, WITH IOL INSERTION    /////right eye; Catalys (Right)    Final Anesthesia Type: MAC      Patient location during evaluation: OPS  Patient participation: Yes- Able to Participate  Level of consciousness: awake and alert  Post-procedure vital signs: reviewed and stable  Pain management: adequate  Airway patency: patent    PONV status at discharge: No PONV  Anesthetic complications: no      Cardiovascular status: blood pressure returned to baseline  Respiratory status: unassisted, room air and spontaneous ventilation  Hydration status: euvolemic  Follow-up not needed.              Vitals Value Taken Time   /77 05/20/25 06:42   Temp 36.5 °C (97.7 °F) 05/20/25 06:42   Pulse 68 05/20/25 06:42   Resp 18 05/20/25 08:07   SpO2 96 % 05/20/25 06:42         No case tracking events are documented in the log.      Pain/Lara Score: No data recorded

## 2025-06-03 ENCOUNTER — ANESTHESIA (OUTPATIENT)
Facility: HOSPITAL | Age: 80
End: 2025-06-03
Payer: MEDICARE

## 2025-06-03 ENCOUNTER — ANESTHESIA EVENT (OUTPATIENT)
Facility: HOSPITAL | Age: 80
End: 2025-06-03
Payer: MEDICARE

## 2025-06-03 ENCOUNTER — HOSPITAL ENCOUNTER (OUTPATIENT)
Facility: HOSPITAL | Age: 80
Discharge: HOME | End: 2025-06-03
Attending: OPHTHALMOLOGY | Admitting: OPHTHALMOLOGY
Payer: MEDICARE

## 2025-06-03 VITALS
TEMPERATURE: 98 F | OXYGEN SATURATION: 96 % | SYSTOLIC BLOOD PRESSURE: 135 MMHG | DIASTOLIC BLOOD PRESSURE: 78 MMHG | HEART RATE: 71 BPM

## 2025-06-03 DIAGNOSIS — H26.9 CATARACT: ICD-10-CM

## 2025-06-03 PROCEDURE — 25000003 PHARM REV CODE 250: Performed by: OPHTHALMOLOGY

## 2025-06-03 PROCEDURE — 25000003 PHARM REV CODE 250

## 2025-06-03 PROCEDURE — 37000009 HC ANESTHESIA EA ADD 15 MINS: Performed by: OPHTHALMOLOGY

## 2025-06-03 PROCEDURE — 37000008 HC ANESTHESIA 1ST 15 MINUTES: Performed by: OPHTHALMOLOGY

## 2025-06-03 PROCEDURE — 63600175 PHARM REV CODE 636 W HCPCS: Performed by: NURSE ANESTHETIST, CERTIFIED REGISTERED

## 2025-06-03 PROCEDURE — 71000015 HC POSTOP RECOV 1ST HR: Performed by: OPHTHALMOLOGY

## 2025-06-03 PROCEDURE — 36000706: Performed by: OPHTHALMOLOGY

## 2025-06-03 PROCEDURE — 63600175 PHARM REV CODE 636 W HCPCS: Performed by: OPHTHALMOLOGY

## 2025-06-03 PROCEDURE — 36000707: Performed by: OPHTHALMOLOGY

## 2025-06-03 PROCEDURE — 27201423 OPTIME MED/SURG SUP & DEVICES STERILE SUPPLY: Performed by: OPHTHALMOLOGY

## 2025-06-03 PROCEDURE — V2632 POST CHMBR INTRAOCULAR LENS: HCPCS | Performed by: OPHTHALMOLOGY

## 2025-06-03 DEVICE — TECNIS SIMPLICITY TECNIS EYHANCE U 21.5D
Type: IMPLANTABLE DEVICE | Site: EYE | Status: FUNCTIONAL
Brand: TECNIS SIMPLICITY

## 2025-06-03 RX ORDER — EPINEPHRINE 1 MG/ML
INJECTION, SOLUTION, CONCENTRATE INTRAVENOUS
Status: DISCONTINUED | OUTPATIENT
Start: 2025-06-03 | End: 2025-06-03 | Stop reason: HOSPADM

## 2025-06-03 RX ORDER — PHENYLEPHRINE HYDROCHLORIDE 100 MG/ML
2 SOLUTION/ DROPS OPHTHALMIC
Status: COMPLETED | OUTPATIENT
Start: 2025-06-03 | End: 2025-06-03

## 2025-06-03 RX ORDER — MOXIFLOXACIN 5 MG/ML
1 SOLUTION/ DROPS OPHTHALMIC
Status: COMPLETED | OUTPATIENT
Start: 2025-06-03 | End: 2025-06-03

## 2025-06-03 RX ORDER — ONDANSETRON HYDROCHLORIDE 2 MG/ML
4 INJECTION, SOLUTION INTRAVENOUS DAILY PRN
OUTPATIENT
Start: 2025-06-03

## 2025-06-03 RX ORDER — MIDAZOLAM HYDROCHLORIDE 1 MG/ML
INJECTION INTRAMUSCULAR; INTRAVENOUS
Status: DISCONTINUED | OUTPATIENT
Start: 2025-06-03 | End: 2025-06-03

## 2025-06-03 RX ORDER — SODIUM CHLORIDE 9 MG/ML
INJECTION, SOLUTION INTRAVENOUS CONTINUOUS
OUTPATIENT
Start: 2025-06-03

## 2025-06-03 RX ORDER — POVIDONE-IODINE 5 %
SOLUTION, NON-ORAL OPHTHALMIC (EYE)
Status: DISCONTINUED | OUTPATIENT
Start: 2025-06-03 | End: 2025-06-03 | Stop reason: HOSPADM

## 2025-06-03 RX ORDER — LIDOCAINE HYDROCHLORIDE 40 MG/ML
INJECTION, SOLUTION RETROBULBAR
Status: DISCONTINUED | OUTPATIENT
Start: 2025-06-03 | End: 2025-06-03 | Stop reason: HOSPADM

## 2025-06-03 RX ORDER — SODIUM CHLORIDE, SODIUM LACTATE, POTASSIUM CHLORIDE, CALCIUM CHLORIDE 600; 310; 30; 20 MG/100ML; MG/100ML; MG/100ML; MG/100ML
INJECTION, SOLUTION INTRAVENOUS CONTINUOUS
OUTPATIENT
Start: 2025-06-03

## 2025-06-03 RX ORDER — SODIUM CHLORIDE, SODIUM GLUCONATE, SODIUM ACETATE, POTASSIUM CHLORIDE AND MAGNESIUM CHLORIDE 30; 37; 368; 526; 502 MG/100ML; MG/100ML; MG/100ML; MG/100ML; MG/100ML
INJECTION, SOLUTION INTRAVENOUS CONTINUOUS
OUTPATIENT
Start: 2025-06-03 | End: 2025-07-03

## 2025-06-03 RX ORDER — BUPIVACAINE HYDROCHLORIDE 7.5 MG/ML
0.1 INJECTION, SOLUTION EPIDURAL; RETROBULBAR
Status: COMPLETED | OUTPATIENT
Start: 2025-06-03 | End: 2025-06-03

## 2025-06-03 RX ORDER — BUPIVACAINE HYDROCHLORIDE 7.5 MG/ML
INJECTION, SOLUTION EPIDURAL; RETROBULBAR
Status: DISCONTINUED | OUTPATIENT
Start: 2025-06-03 | End: 2025-06-03 | Stop reason: HOSPADM

## 2025-06-03 RX ORDER — TROPICAMIDE 10 MG/ML
2 SOLUTION/ DROPS OPHTHALMIC
Status: COMPLETED | OUTPATIENT
Start: 2025-06-03 | End: 2025-06-03

## 2025-06-03 RX ADMIN — PHENYLEPHRINE HYDROCHLORIDE 2 DROP: 100 SOLUTION/ DROPS OPHTHALMIC at 09:06

## 2025-06-03 RX ADMIN — MOXIFLOXACIN 1 DROP: 5 SOLUTION/ DROPS OPHTHALMIC at 09:06

## 2025-06-03 RX ADMIN — TROPICAMIDE 2 DROP: 10 SOLUTION/ DROPS OPHTHALMIC at 09:06

## 2025-06-03 RX ADMIN — BUPIVACAINE HYDROCHLORIDE 0.75 MG: 7.5 INJECTION, SOLUTION EPIDURAL; RETROBULBAR at 09:06

## 2025-06-03 RX ADMIN — MIDAZOLAM HYDROCHLORIDE 1 MG: 1 INJECTION, SOLUTION INTRAMUSCULAR; INTRAVENOUS at 10:06

## (undated) DEVICE — GOWN POLY REINF BRTH SLV XL

## (undated) DEVICE — ADAPTER DUAL NSL LUER M-M 7FT

## (undated) DEVICE — GLOVE SENSICARE PI MICRO 7

## (undated) DEVICE — CARTRIDGE SHOOTER

## (undated) DEVICE — Device

## (undated) DEVICE — TIP INTREPID I/A STR .03MM

## (undated) DEVICE — TIP PHACO 30 DEG BEVEL 20GA

## (undated) DEVICE — PACK EYE SWAN DISPOSABLE

## (undated) DEVICE — PACK CASSETTE TUBE ELLIPS FX